# Patient Record
Sex: MALE | Race: BLACK OR AFRICAN AMERICAN | NOT HISPANIC OR LATINO | URBAN - METROPOLITAN AREA
[De-identification: names, ages, dates, MRNs, and addresses within clinical notes are randomized per-mention and may not be internally consistent; named-entity substitution may affect disease eponyms.]

---

## 2024-10-10 ENCOUNTER — INPATIENT (INPATIENT)
Facility: HOSPITAL | Age: 24
LOS: 3 days | Discharge: HOME | End: 2024-10-14
Attending: STUDENT IN AN ORGANIZED HEALTH CARE EDUCATION/TRAINING PROGRAM | Admitting: STUDENT IN AN ORGANIZED HEALTH CARE EDUCATION/TRAINING PROGRAM
Payer: MEDICAID

## 2024-10-10 VITALS
DIASTOLIC BLOOD PRESSURE: 57 MMHG | RESPIRATION RATE: 18 BRPM | WEIGHT: 205.03 LBS | HEART RATE: 122 BPM | TEMPERATURE: 99 F | SYSTOLIC BLOOD PRESSURE: 87 MMHG | OXYGEN SATURATION: 97 % | HEIGHT: 70 IN

## 2024-10-10 LAB
ADD ON TEST-SPECIMEN IN LAB: SIGNIFICANT CHANGE UP
ALBUMIN SERPL ELPH-MCNC: 4 G/DL — SIGNIFICANT CHANGE UP (ref 3.3–5)
ALP SERPL-CCNC: 65 U/L — SIGNIFICANT CHANGE UP (ref 40–120)
ALT FLD-CCNC: 40 U/L — SIGNIFICANT CHANGE UP (ref 4–41)
ANION GAP SERPL CALC-SCNC: 17 MMOL/L — HIGH (ref 7–14)
ANISOCYTOSIS BLD QL: SLIGHT — SIGNIFICANT CHANGE UP
AST SERPL-CCNC: 37 U/L — SIGNIFICANT CHANGE UP (ref 4–40)
BASOPHILS # BLD AUTO: 0 K/UL — SIGNIFICANT CHANGE UP (ref 0–0.2)
BASOPHILS NFR BLD AUTO: 0 % — SIGNIFICANT CHANGE UP (ref 0–2)
BILIRUB SERPL-MCNC: 7.3 MG/DL — HIGH (ref 0.2–1.2)
BLOOD GAS VENOUS COMPREHENSIVE RESULT: SIGNIFICANT CHANGE UP
BUN SERPL-MCNC: 20 MG/DL — SIGNIFICANT CHANGE UP (ref 7–23)
CALCIUM SERPL-MCNC: 9 MG/DL — SIGNIFICANT CHANGE UP (ref 8.4–10.5)
CHLORIDE SERPL-SCNC: 98 MMOL/L — SIGNIFICANT CHANGE UP (ref 98–107)
CK SERPL-CCNC: 81 U/L — SIGNIFICANT CHANGE UP (ref 30–200)
CO2 SERPL-SCNC: 22 MMOL/L — SIGNIFICANT CHANGE UP (ref 22–31)
CREAT SERPL-MCNC: 1.65 MG/DL — HIGH (ref 0.5–1.3)
EGFR: 59 ML/MIN/1.73M2 — LOW
EOSINOPHIL # BLD AUTO: 0 K/UL — SIGNIFICANT CHANGE UP (ref 0–0.5)
EOSINOPHIL NFR BLD AUTO: 0 % — SIGNIFICANT CHANGE UP (ref 0–6)
FLUAV AG NPH QL: SIGNIFICANT CHANGE UP
FLUBV AG NPH QL: SIGNIFICANT CHANGE UP
GAS PNL BLDV: SIGNIFICANT CHANGE UP
GIANT PLATELETS BLD QL SMEAR: PRESENT — SIGNIFICANT CHANGE UP
GLUCOSE SERPL-MCNC: 86 MG/DL — SIGNIFICANT CHANGE UP (ref 70–99)
HCT VFR BLD CALC: 50.3 % — HIGH (ref 39–50)
HGB BLD-MCNC: 17.2 G/DL — HIGH (ref 13–17)
HIV 1+2 AB+HIV1 P24 AG SERPL QL IA: SIGNIFICANT CHANGE UP
HIV 1+2 AB+HIV1 P24 AG SERPL QL IA: SIGNIFICANT CHANGE UP
IANC: 7.64 K/UL — HIGH (ref 1.8–7.4)
LYMPHOCYTES # BLD AUTO: 0.3 K/UL — LOW (ref 1–3.3)
LYMPHOCYTES # BLD AUTO: 3.5 % — LOW (ref 13–44)
MCHC RBC-ENTMCNC: 29.3 PG — SIGNIFICANT CHANGE UP (ref 27–34)
MCHC RBC-ENTMCNC: 34.2 GM/DL — SIGNIFICANT CHANGE UP (ref 32–36)
MCV RBC AUTO: 85.7 FL — SIGNIFICANT CHANGE UP (ref 80–100)
METAMYELOCYTES # FLD: 6.1 % — HIGH (ref 0–1)
MONOCYTES # BLD AUTO: 0.08 K/UL — SIGNIFICANT CHANGE UP (ref 0–0.9)
MONOCYTES NFR BLD AUTO: 0.9 % — LOW (ref 2–14)
NEUTROPHILS # BLD AUTO: 7.55 K/UL — HIGH (ref 1.8–7.4)
NEUTROPHILS NFR BLD AUTO: 80 % — HIGH (ref 43–77)
NEUTS BAND # BLD: 9.5 % — HIGH (ref 0–6)
PLAT MORPH BLD: NORMAL — SIGNIFICANT CHANGE UP
PLATELET # BLD AUTO: 21 K/UL — LOW (ref 150–400)
PLATELET COUNT - ESTIMATE: ABNORMAL
POLYCHROMASIA BLD QL SMEAR: SLIGHT — SIGNIFICANT CHANGE UP
POTASSIUM SERPL-MCNC: 4 MMOL/L — SIGNIFICANT CHANGE UP (ref 3.5–5.3)
POTASSIUM SERPL-SCNC: 4 MMOL/L — SIGNIFICANT CHANGE UP (ref 3.5–5.3)
PROT SERPL-MCNC: 6.6 G/DL — SIGNIFICANT CHANGE UP (ref 6–8.3)
RBC # BLD: 5.87 M/UL — HIGH (ref 4.2–5.8)
RBC # FLD: 12.5 % — SIGNIFICANT CHANGE UP (ref 10.3–14.5)
RBC BLD AUTO: ABNORMAL
RSV RNA NPH QL NAA+NON-PROBE: SIGNIFICANT CHANGE UP
SARS-COV-2 RNA SPEC QL NAA+PROBE: SIGNIFICANT CHANGE UP
SMUDGE CELLS # BLD: PRESENT — SIGNIFICANT CHANGE UP
SODIUM SERPL-SCNC: 137 MMOL/L — SIGNIFICANT CHANGE UP (ref 135–145)
WBC # BLD: 8.44 K/UL — SIGNIFICANT CHANGE UP (ref 3.8–10.5)
WBC # FLD AUTO: 8.44 K/UL — SIGNIFICANT CHANGE UP (ref 3.8–10.5)

## 2024-10-10 PROCEDURE — 99285 EMERGENCY DEPT VISIT HI MDM: CPT

## 2024-10-10 PROCEDURE — 71046 X-RAY EXAM CHEST 2 VIEWS: CPT | Mod: 26

## 2024-10-10 RX ORDER — ACETAMINOPHEN 325 MG
975 TABLET ORAL ONCE
Refills: 0 | Status: COMPLETED | OUTPATIENT
Start: 2024-10-10 | End: 2024-10-10

## 2024-10-10 RX ORDER — SODIUM CHLORIDE 0.9 % (FLUSH) 0.9 %
1000 SYRINGE (ML) INJECTION ONCE
Refills: 0 | Status: COMPLETED | OUTPATIENT
Start: 2024-10-10 | End: 2024-10-10

## 2024-10-10 RX ORDER — PIPERACILLIN SODIUM AND TAZOBACTAM SODIUM 12; 1.5 G/60ML; G/60ML
3.38 INJECTION, POWDER, LYOPHILIZED, FOR SOLUTION INTRAVENOUS ONCE
Refills: 0 | Status: COMPLETED | OUTPATIENT
Start: 2024-10-10 | End: 2024-10-10

## 2024-10-10 RX ADMIN — PIPERACILLIN SODIUM AND TAZOBACTAM SODIUM 200 GRAM(S): 12; 1.5 INJECTION, POWDER, LYOPHILIZED, FOR SOLUTION INTRAVENOUS at 21:39

## 2024-10-10 RX ADMIN — Medication 1000 MILLILITER(S): at 19:46

## 2024-10-10 RX ADMIN — Medication 1000 MILLILITER(S): at 21:39

## 2024-10-10 RX ADMIN — Medication 975 MILLIGRAM(S): at 21:37

## 2024-10-10 NOTE — ED PROVIDER NOTE - OBJECTIVE STATEMENT
24 year old man no PMH presenting with 5 days of fevers, body aches, chills. He recently returned from [place] 2 weeks ago, no malaria prophylaxis taken. 5 days ago he began having fevers every other day, body aches, chills, fatigue, cough. yesterday he noticed blood in his urine and it has been dark ever since. He also vomited earlier today. He denies any chest pain, SOB, sore throat, abdominal pain, diarrhea, dysuria, headache.

## 2024-10-10 NOTE — ED PROVIDER NOTE - PHYSICAL EXAMINATION
Physical Exam:  Gen: no acute distress, AOx3, nontoxic appearing  Head: NCAT  HEENT: EOMI, PEERLA, normal conjunctiva, tongue midline, oral mucosa dry  Lung: CTAB, no respiratory distress, no wheezes/rhonchi/rales B/L, speaking in full sentences  CV: RRR, no murmurs, rubs or gallops  Abd: soft, NT, ND, no guarding, no rigidity, no rebound tenderness, no CVA tenderness  MSK: no visible deformities, ROM normal in UE/LE, no neck / back pain, calf tenderness  Neuro: No focal sensory or motor deficits  Skin: Warm, well perfused, no rash, no leg swelling

## 2024-10-10 NOTE — ED ADULT TRIAGE NOTE - WEIGHT IN KG
This CM spoke with pt's daughter this day while pt off floor for dialysis. Informed pt's daughter that [facility] offered STR for pt - daughter accepted. [facility] in [facility]. This CM spoke with Risa Borjas from dialysis this day - they are presently working on getting an HD slot at [facility]. This CM to initiate prior authorization with [employer] this day. No further discharge needs at this time. CM to continue to follow. UPDATE: This CM received call from Risa Borjas with [facility] dialysis - pt had chair slot at [address] at 10:15am.  This CM spoke with nephrologist for pt who confirms pt will be \"okay\" with dialysis until Thursday at 10:15am.  At this time, all we are waiting on for discharge is prior authorization from [facility] for approval for pt to transition to SNF for STR. 93

## 2024-10-10 NOTE — ED PROVIDER NOTE - ATTENDING CONTRIBUTION TO CARE
24-year-old male otherwise healthy presents with 5 days of subjective fevers chills weakness runny nose cough nausea 1 episode of vomiting.  Patient also noted yesterday he had blood in his urine which resolved. urine is dark today does have dysuria.  Patient has had STD in the past but he is not sure if he is concerned about at this point and previous with testing.  Patient was in [place] 2 weeks ago he was not sick while he was there and was not sick 1 week after returning until this week.  Patient is well-appearing he is tacky and febrile his lungs are clear his abdomen is nontender likely viral syndrome however was initially hypotensive so we will check labs cultures flu COVID swab supportive care and reassess and will check urine as well for UTI 24-year-old male otherwise healthy presents with 5 days of subjective fevers chills weakness runny nose cough nausea 1 episode of vomiting.  Patient also noted yesterday he had blood in his urine which resolved. urine is dark today does have dysuria.  Patient has had STD in the past but he is not sure if he is concerned about at this point and previous with testing.  Patient was in [place] 2 weeks ago he was not sick while he was there and was not sick 1 week after returning until this week.  Patient is well-appearing he is tacky and febrile his lungs are clear his abdomen is nontender likely viral syndrome however was initially hypotensive so we will check labs cultures flu COVID swab supportive care and reassess and will check urine as well for UTI  pt moved to [place] about [place], visits [place] yearly, did not take malaria PPX, consider malaria

## 2024-10-10 NOTE — ED ADULT NURSE NOTE - CHIEF COMPLAINT QUOTE
C/O flu-like symptoms, cough, runny nose congestion, fevers, chills since Sunday. Reporting hematuria and dark urine since yesterday. States he was recently in [place], came back 2 weeks ago. Denies CP, SOB, rashes. Took Motrin 2 pm. No significant medical history.

## 2024-10-10 NOTE — ED ADULT TRIAGE NOTE - CHIEF COMPLAINT QUOTE
C/O flu-like symptoms, cough, runny nose congestion, fevers, chills since Sunday. Reporting hematuria and dark urine since yesterday. States he was recently in [place], came back 2 weeks ago. Took Motrin 2 pm. No significant medical history. C/O flu-like symptoms, cough, runny nose congestion, fevers, chills since Sunday. Reporting hematuria and dark urine since yesterday. States he was recently in [place], came back 2 weeks ago. Denies CP, SOB, rashes. Took Motrin 2 pm. No significant medical history.

## 2024-10-10 NOTE — ED PROVIDER NOTE - PROGRESS NOTE DETAILS
Saint Jason, DO (PGY2): Patient signed out to me at 0 700 pending CT.  Briefly, he is a 24-year-old male, with no significant past medical history, who presented for 5 days of fever, body aches, chills.  Patient with recent trip to [place] 2 weeks ago.  Labs notable for thrombocytopenia, new thrombocytopenia, with platelets of 21.  slightly elevated PT and PTT, elevated bilirubin, decreased haptoglobin, elevated lactic dehydrogenase.  UA with hematuria and nitrite positive nitrites.  Chest x-ray negative.  Patient pending CTAP results, s/p IVF and Zosyn Saint Jason, DO (PGY2): Patient signed out to me at 0700 pending CT.  Briefly, he is a 24-year-old male, with no significant past medical history, who presented for 5 days of fever, body aches, chills.  Patient with recent trip to [place] 2 weeks ago.  Labs notable for thrombocytopenia, new thrombocytopenia, with platelets of 21.  slightly elevated PT and PTT, elevated bilirubin, decreased haptoglobin, elevated lactic dehydrogenase.  UA with hematuria and nitrite positive nitrites.  Chest x-ray negative.  Patient pending CTAP results, s/p IVF and Zosyn Saint Jason, DO (PGY2): CT negative for acute pathology. Will admit patient for infectious workup Saint Jason (PGY2): Case discussed with hospitalist. Patient admitted. Saint Jason, DO (PGY2): CT negative for acute pathology. Will admit patient for infectious workup. Patient in agreement with plan

## 2024-10-10 NOTE — ED PROVIDER NOTE - CLINICAL SUMMARY MEDICAL DECISION MAKING FREE TEXT BOX
24 year old man no PMH presenting with 5 days of fevers, body aches, chills with recent travel, dark urine concerning for URI / flu, viral myositis, malaria, bronchitis, PNA, UTI. CBC, CMP, VBG, BCX, UA, UCX, CK.

## 2024-10-11 DIAGNOSIS — B50.9 PLASMODIUM FALCIPARUM MALARIA, UNSPECIFIED: ICD-10-CM

## 2024-10-11 DIAGNOSIS — Z29.9 ENCOUNTER FOR PROPHYLACTIC MEASURES, UNSPECIFIED: ICD-10-CM

## 2024-10-11 DIAGNOSIS — D69.6 THROMBOCYTOPENIA, UNSPECIFIED: ICD-10-CM

## 2024-10-11 DIAGNOSIS — R50.9 FEVER, UNSPECIFIED: ICD-10-CM

## 2024-10-11 DIAGNOSIS — N17.9 ACUTE KIDNEY FAILURE, UNSPECIFIED: ICD-10-CM

## 2024-10-11 DIAGNOSIS — D65 DISSEMINATED INTRAVASCULAR COAGULATION [DEFIBRINATION SYNDROME]: ICD-10-CM

## 2024-10-11 DIAGNOSIS — Z78.9 OTHER SPECIFIED HEALTH STATUS: Chronic | ICD-10-CM

## 2024-10-11 LAB
ADD ON TEST-SPECIMEN IN LAB: SIGNIFICANT CHANGE UP
ALBUMIN SERPL ELPH-MCNC: 3 G/DL — LOW (ref 3.3–5)
ALBUMIN SERPL ELPH-MCNC: 3.3 G/DL — SIGNIFICANT CHANGE UP (ref 3.3–5)
ALBUMIN SERPL ELPH-MCNC: 3.4 G/DL — SIGNIFICANT CHANGE UP (ref 3.3–5)
ALP SERPL-CCNC: 49 U/L — SIGNIFICANT CHANGE UP (ref 40–120)
ALP SERPL-CCNC: 54 U/L — SIGNIFICANT CHANGE UP (ref 40–120)
ALP SERPL-CCNC: 58 U/L — SIGNIFICANT CHANGE UP (ref 40–120)
ALT FLD-CCNC: 31 U/L — SIGNIFICANT CHANGE UP (ref 4–41)
ALT FLD-CCNC: 36 U/L — SIGNIFICANT CHANGE UP (ref 4–41)
ALT FLD-CCNC: 37 U/L — SIGNIFICANT CHANGE UP (ref 4–41)
AMORPH CRY # UR COMP ASSIST: PRESENT
ANION GAP SERPL CALC-SCNC: 10 MMOL/L — SIGNIFICANT CHANGE UP (ref 7–14)
ANION GAP SERPL CALC-SCNC: 10 MMOL/L — SIGNIFICANT CHANGE UP (ref 7–14)
ANION GAP SERPL CALC-SCNC: 13 MMOL/L — SIGNIFICANT CHANGE UP (ref 7–14)
APPEARANCE UR: ABNORMAL
APTT BLD: 36.4 SEC — HIGH (ref 24.5–35.6)
APTT BLD: 37.8 SEC — HIGH (ref 24.5–35.6)
AST SERPL-CCNC: 31 U/L — SIGNIFICANT CHANGE UP (ref 4–40)
AST SERPL-CCNC: 34 U/L — SIGNIFICANT CHANGE UP (ref 4–40)
AST SERPL-CCNC: 43 U/L — HIGH (ref 4–40)
BACTERIA # UR AUTO: NEGATIVE /HPF — SIGNIFICANT CHANGE UP
BASOPHILS # BLD AUTO: 0 K/UL — SIGNIFICANT CHANGE UP (ref 0–0.2)
BASOPHILS NFR BLD AUTO: 0 % — SIGNIFICANT CHANGE UP (ref 0–2)
BILIRUB DIRECT SERPL-MCNC: 3.5 MG/DL — HIGH (ref 0–0.3)
BILIRUB INDIRECT FLD-MCNC: 1.3 MG/DL — HIGH (ref 0–1)
BILIRUB SERPL-MCNC: 3.5 MG/DL — HIGH (ref 0.2–1.2)
BILIRUB SERPL-MCNC: 4 MG/DL — HIGH (ref 0.2–1.2)
BILIRUB SERPL-MCNC: 4.8 MG/DL — HIGH (ref 0.2–1.2)
BILIRUB SERPL-MCNC: 4.8 MG/DL — HIGH (ref 0.2–1.2)
BILIRUB UR-MCNC: ABNORMAL
BLD GP AB SCN SERPL QL: NEGATIVE — SIGNIFICANT CHANGE UP
BUN SERPL-MCNC: 16 MG/DL — SIGNIFICANT CHANGE UP (ref 7–23)
BUN SERPL-MCNC: 17 MG/DL — SIGNIFICANT CHANGE UP (ref 7–23)
BUN SERPL-MCNC: 19 MG/DL — SIGNIFICANT CHANGE UP (ref 7–23)
C TRACH RRNA SPEC QL NAA+PROBE: SIGNIFICANT CHANGE UP
CALCIUM SERPL-MCNC: 7.3 MG/DL — LOW (ref 8.4–10.5)
CALCIUM SERPL-MCNC: 8.3 MG/DL — LOW (ref 8.4–10.5)
CALCIUM SERPL-MCNC: 8.6 MG/DL — SIGNIFICANT CHANGE UP (ref 8.4–10.5)
CAST: 2 /LPF — SIGNIFICANT CHANGE UP (ref 0–4)
CHLORIDE SERPL-SCNC: 100 MMOL/L — SIGNIFICANT CHANGE UP (ref 98–107)
CHLORIDE SERPL-SCNC: 102 MMOL/L — SIGNIFICANT CHANGE UP (ref 98–107)
CHLORIDE SERPL-SCNC: 102 MMOL/L — SIGNIFICANT CHANGE UP (ref 98–107)
CO2 SERPL-SCNC: 23 MMOL/L — SIGNIFICANT CHANGE UP (ref 22–31)
COLOR SPEC: SIGNIFICANT CHANGE UP
CREAT SERPL-MCNC: 1.21 MG/DL — SIGNIFICANT CHANGE UP (ref 0.5–1.3)
CREAT SERPL-MCNC: 1.25 MG/DL — SIGNIFICANT CHANGE UP (ref 0.5–1.3)
CREAT SERPL-MCNC: 1.31 MG/DL — HIGH (ref 0.5–1.3)
CULTURE RESULTS: ABNORMAL
DIFF PNL FLD: ABNORMAL
EGFR: 77 ML/MIN/1.73M2 — SIGNIFICANT CHANGE UP
EGFR: 82 ML/MIN/1.73M2 — SIGNIFICANT CHANGE UP
EGFR: 85 ML/MIN/1.73M2 — SIGNIFICANT CHANGE UP
EOSINOPHIL # BLD AUTO: 0 K/UL — SIGNIFICANT CHANGE UP (ref 0–0.5)
EOSINOPHIL NFR BLD AUTO: 0 % — SIGNIFICANT CHANGE UP (ref 0–6)
FIBRINOGEN PPP-MCNC: 376 MG/DL — SIGNIFICANT CHANGE UP (ref 200–465)
GAS PNL BLDV: SIGNIFICANT CHANGE UP
GAS PNL BLDV: SIGNIFICANT CHANGE UP
GIANT PLATELETS BLD QL SMEAR: PRESENT — SIGNIFICANT CHANGE UP
GLUCOSE SERPL-MCNC: 132 MG/DL — HIGH (ref 70–99)
GLUCOSE SERPL-MCNC: 86 MG/DL — SIGNIFICANT CHANGE UP (ref 70–99)
GLUCOSE SERPL-MCNC: 88 MG/DL — SIGNIFICANT CHANGE UP (ref 70–99)
GLUCOSE UR QL: NEGATIVE MG/DL — SIGNIFICANT CHANGE UP
HAPTOGLOB SERPL-MCNC: 27 MG/DL — LOW (ref 34–200)
HCT VFR BLD CALC: 39.9 % — SIGNIFICANT CHANGE UP (ref 39–50)
HCT VFR BLD CALC: 41.6 % — SIGNIFICANT CHANGE UP (ref 39–50)
HCT VFR BLD CALC: 43.1 % — SIGNIFICANT CHANGE UP (ref 39–50)
HGB BLD-MCNC: 13.7 G/DL — SIGNIFICANT CHANGE UP (ref 13–17)
HGB BLD-MCNC: 14.6 G/DL — SIGNIFICANT CHANGE UP (ref 13–17)
HGB BLD-MCNC: 15.1 G/DL — SIGNIFICANT CHANGE UP (ref 13–17)
IANC: 5.48 K/UL — SIGNIFICANT CHANGE UP (ref 1.8–7.4)
INR BLD: 1.25 RATIO — HIGH (ref 0.85–1.16)
INR BLD: 1.49 RATIO — HIGH (ref 0.85–1.16)
KETONES UR-MCNC: 40 MG/DL
LDH SERPL L TO P-CCNC: 300 U/L — HIGH (ref 135–225)
LEUKOCYTE ESTERASE UR-ACNC: ABNORMAL
LYMPHOCYTES # BLD AUTO: 0.82 K/UL — LOW (ref 1–3.3)
LYMPHOCYTES # BLD AUTO: 11.3 % — LOW (ref 13–44)
MAGNESIUM SERPL-MCNC: 1.8 MG/DL — SIGNIFICANT CHANGE UP (ref 1.6–2.6)
MAGNESIUM SERPL-MCNC: 1.9 MG/DL — SIGNIFICANT CHANGE UP (ref 1.6–2.6)
MCHC RBC-ENTMCNC: 29.1 PG — SIGNIFICANT CHANGE UP (ref 27–34)
MCHC RBC-ENTMCNC: 29.1 PG — SIGNIFICANT CHANGE UP (ref 27–34)
MCHC RBC-ENTMCNC: 29.9 PG — SIGNIFICANT CHANGE UP (ref 27–34)
MCHC RBC-ENTMCNC: 34.3 GM/DL — SIGNIFICANT CHANGE UP (ref 32–36)
MCHC RBC-ENTMCNC: 35 GM/DL — SIGNIFICANT CHANGE UP (ref 32–36)
MCHC RBC-ENTMCNC: 35.1 GM/DL — SIGNIFICANT CHANGE UP (ref 32–36)
MCV RBC AUTO: 83 FL — SIGNIFICANT CHANGE UP (ref 80–100)
MCV RBC AUTO: 84.9 FL — SIGNIFICANT CHANGE UP (ref 80–100)
MCV RBC AUTO: 85.3 FL — SIGNIFICANT CHANGE UP (ref 80–100)
METAMYELOCYTES # FLD: 0.9 % — SIGNIFICANT CHANGE UP (ref 0–1)
MONOCYTES # BLD AUTO: 0.07 K/UL — SIGNIFICANT CHANGE UP (ref 0–0.9)
MONOCYTES NFR BLD AUTO: 0.9 % — LOW (ref 2–14)
MUCOUS THREADS # UR AUTO: PRESENT
N GONORRHOEA RRNA SPEC QL NAA+PROBE: SIGNIFICANT CHANGE UP
NEUTROPHILS # BLD AUTO: 6.15 K/UL — SIGNIFICANT CHANGE UP (ref 1.8–7.4)
NEUTROPHILS NFR BLD AUTO: 51.3 % — SIGNIFICANT CHANGE UP (ref 43–77)
NEUTS BAND # BLD: 33 % — CRITICAL HIGH (ref 0–6)
NITRITE UR-MCNC: POSITIVE
NRBC # BLD: 0 /100 WBCS — SIGNIFICANT CHANGE UP (ref 0–0)
NRBC # BLD: 0 /100 WBCS — SIGNIFICANT CHANGE UP (ref 0–0)
NRBC # FLD: 0 K/UL — SIGNIFICANT CHANGE UP (ref 0–0)
NRBC # FLD: 0 K/UL — SIGNIFICANT CHANGE UP (ref 0–0)
PH UR: 6 — SIGNIFICANT CHANGE UP (ref 5–8)
PHOSPHATE SERPL-MCNC: 1.3 MG/DL — LOW (ref 2.5–4.5)
PHOSPHATE SERPL-MCNC: 1.9 MG/DL — LOW (ref 2.5–4.5)
PLAT MORPH BLD: NORMAL — SIGNIFICANT CHANGE UP
PLATELET # BLD AUTO: 19 K/UL — CRITICAL LOW (ref 150–400)
PLATELET # BLD AUTO: 22 K/UL — LOW (ref 150–400)
PLATELET # BLD AUTO: 25 K/UL — LOW (ref 150–400)
PLATELET COUNT - ESTIMATE: ABNORMAL
POIKILOCYTOSIS BLD QL AUTO: SLIGHT — SIGNIFICANT CHANGE UP
POTASSIUM SERPL-MCNC: 3.6 MMOL/L — SIGNIFICANT CHANGE UP (ref 3.5–5.3)
POTASSIUM SERPL-MCNC: 3.7 MMOL/L — SIGNIFICANT CHANGE UP (ref 3.5–5.3)
POTASSIUM SERPL-MCNC: 3.8 MMOL/L — SIGNIFICANT CHANGE UP (ref 3.5–5.3)
POTASSIUM SERPL-SCNC: 3.6 MMOL/L — SIGNIFICANT CHANGE UP (ref 3.5–5.3)
POTASSIUM SERPL-SCNC: 3.7 MMOL/L — SIGNIFICANT CHANGE UP (ref 3.5–5.3)
POTASSIUM SERPL-SCNC: 3.8 MMOL/L — SIGNIFICANT CHANGE UP (ref 3.5–5.3)
PROCALCITONIN SERPL-MCNC: 25.99 NG/ML — HIGH (ref 0.02–0.1)
PROT SERPL-MCNC: 4.9 G/DL — LOW (ref 6–8.3)
PROT SERPL-MCNC: 6.2 G/DL — SIGNIFICANT CHANGE UP (ref 6–8.3)
PROT SERPL-MCNC: 6.2 G/DL — SIGNIFICANT CHANGE UP (ref 6–8.3)
PROT UR-MCNC: 100 MG/DL
PROTHROM AB SERPL-ACNC: 14.8 SEC — HIGH (ref 9.9–13.4)
PROTHROM AB SERPL-ACNC: 17.7 SEC — HIGH (ref 9.9–13.4)
RBC # BLD: 4.7 M/UL — SIGNIFICANT CHANGE UP (ref 4.2–5.8)
RBC # BLD: 5.01 M/UL — SIGNIFICANT CHANGE UP (ref 4.2–5.8)
RBC # BLD: 5.05 M/UL — SIGNIFICANT CHANGE UP (ref 4.2–5.8)
RBC # FLD: 12.9 % — SIGNIFICANT CHANGE UP (ref 10.3–14.5)
RBC # FLD: 13 % — SIGNIFICANT CHANGE UP (ref 10.3–14.5)
RBC # FLD: 13.1 % — SIGNIFICANT CHANGE UP (ref 10.3–14.5)
RBC BLD AUTO: NORMAL — SIGNIFICANT CHANGE UP
RBC CASTS # UR COMP ASSIST: 13 /HPF — HIGH (ref 0–4)
REVIEW: SIGNIFICANT CHANGE UP
RH IG SCN BLD-IMP: POSITIVE — SIGNIFICANT CHANGE UP
SMUDGE CELLS # BLD: PRESENT — SIGNIFICANT CHANGE UP
SODIUM SERPL-SCNC: 135 MMOL/L — SIGNIFICANT CHANGE UP (ref 135–145)
SODIUM SERPL-SCNC: 135 MMOL/L — SIGNIFICANT CHANGE UP (ref 135–145)
SODIUM SERPL-SCNC: 136 MMOL/L — SIGNIFICANT CHANGE UP (ref 135–145)
SP GR SPEC: 1.03 — SIGNIFICANT CHANGE UP (ref 1–1.03)
SPECIMEN SOURCE: SIGNIFICANT CHANGE UP
SPECIMEN SOURCE: SIGNIFICANT CHANGE UP
SQUAMOUS # UR AUTO: 2 /HPF — SIGNIFICANT CHANGE UP (ref 0–5)
UROBILINOGEN FLD QL: 2 MG/DL (ref 0.2–1)
VARIANT LYMPHS # BLD: 2.6 % — SIGNIFICANT CHANGE UP (ref 0–6)
WBC # BLD: 6.39 K/UL — SIGNIFICANT CHANGE UP (ref 3.8–10.5)
WBC # BLD: 7.23 K/UL — SIGNIFICANT CHANGE UP (ref 3.8–10.5)
WBC # BLD: 7.29 K/UL — SIGNIFICANT CHANGE UP (ref 3.8–10.5)
WBC # FLD AUTO: 6.39 K/UL — SIGNIFICANT CHANGE UP (ref 3.8–10.5)
WBC # FLD AUTO: 7.23 K/UL — SIGNIFICANT CHANGE UP (ref 3.8–10.5)
WBC # FLD AUTO: 7.29 K/UL — SIGNIFICANT CHANGE UP (ref 3.8–10.5)
WBC UR QL: 0 /HPF — SIGNIFICANT CHANGE UP (ref 0–5)

## 2024-10-11 PROCEDURE — 99254 IP/OBS CNSLTJ NEW/EST MOD 60: CPT | Mod: GC

## 2024-10-11 PROCEDURE — 74177 CT ABD & PELVIS W/CONTRAST: CPT | Mod: 26,MC

## 2024-10-11 PROCEDURE — 99223 1ST HOSP IP/OBS HIGH 75: CPT

## 2024-10-11 RX ORDER — ARTESUNATE 110 MG
220 KIT INTRAVENOUS EVERY 12 HOURS
Refills: 0 | Status: COMPLETED | OUTPATIENT
Start: 2024-10-11 | End: 2024-10-12

## 2024-10-11 RX ORDER — ACETAMINOPHEN 325 MG
650 TABLET ORAL EVERY 6 HOURS
Refills: 0 | Status: DISCONTINUED | OUTPATIENT
Start: 2024-10-11 | End: 2024-10-14

## 2024-10-11 RX ORDER — ARTEMETHER AND LUMEFANTRINE 20; 120 MG/1; MG/1
4 TABLET ORAL EVERY 8 HOURS
Refills: 0 | Status: DISCONTINUED | OUTPATIENT
Start: 2024-10-11 | End: 2024-10-11

## 2024-10-11 RX ORDER — SODIUM PHOSPHATE IN D5W 15MMOL/250
15 PLASTIC BAG, INJECTION (ML) INTRAVENOUS ONCE
Refills: 0 | Status: COMPLETED | OUTPATIENT
Start: 2024-10-11 | End: 2024-10-11

## 2024-10-11 RX ORDER — INFLUENZA VIRUS VACCINE 15; 15; 15; 15 UG/.5ML; UG/.5ML; UG/.5ML; UG/.5ML
0.5 SUSPENSION INTRAMUSCULAR ONCE
Refills: 0 | Status: DISCONTINUED | OUTPATIENT
Start: 2024-10-11 | End: 2024-10-14

## 2024-10-11 RX ORDER — PIPERACILLIN SODIUM AND TAZOBACTAM SODIUM 12; 1.5 G/60ML; G/60ML
3.38 INJECTION, POWDER, LYOPHILIZED, FOR SOLUTION INTRAVENOUS ONCE
Refills: 0 | Status: COMPLETED | OUTPATIENT
Start: 2024-10-11 | End: 2024-10-11

## 2024-10-11 RX ORDER — PIPERACILLIN SODIUM AND TAZOBACTAM SODIUM 12; 1.5 G/60ML; G/60ML
3.38 INJECTION, POWDER, LYOPHILIZED, FOR SOLUTION INTRAVENOUS ONCE
Refills: 0 | Status: DISCONTINUED | OUTPATIENT
Start: 2024-10-11 | End: 2024-10-11

## 2024-10-11 RX ORDER — ARTEMETHER AND LUMEFANTRINE 20; 120 MG/1; MG/1
TABLET ORAL
Refills: 0 | Status: DISCONTINUED | OUTPATIENT
Start: 2024-10-11 | End: 2024-10-11

## 2024-10-11 RX ORDER — 5-HYDROXYTRYPTOPHAN (5-HTP) 100 MG
3 TABLET,DISINTEGRATING ORAL AT BEDTIME
Refills: 0 | Status: DISCONTINUED | OUTPATIENT
Start: 2024-10-11 | End: 2024-10-14

## 2024-10-11 RX ADMIN — ARTEMETHER AND LUMEFANTRINE 4 TABLET(S): 20; 120 TABLET ORAL at 12:45

## 2024-10-11 RX ADMIN — ARTESUNATE 220 MILLIGRAM(S): KIT at 14:33

## 2024-10-11 RX ADMIN — Medication 650 MILLIGRAM(S): at 21:57

## 2024-10-11 RX ADMIN — Medication 63.75 MILLIMOLE(S): at 17:17

## 2024-10-11 RX ADMIN — Medication 650 MILLIGRAM(S): at 22:57

## 2024-10-11 RX ADMIN — PIPERACILLIN SODIUM AND TAZOBACTAM SODIUM 25 GRAM(S): 12; 1.5 INJECTION, POWDER, LYOPHILIZED, FOR SOLUTION INTRAVENOUS at 07:47

## 2024-10-11 NOTE — H&P ADULT - PROBLEM SELECTOR PLAN 2
-consent for transfusion  -trend plt, coags 2/2 hemolysis 2/2 falciparum malaria infection. Plt on admission 21 -> 19. PT/PTT/INR 17.7/36.4/1.49 on admission.    -consent for transfusion  -trend plt, coags

## 2024-10-11 NOTE — H&P ADULT - NSHPSOCIALHISTORY_GEN_ALL_CORE
Visits [place] 2x per year to visit extended family, works as an . Sexually active, tested once per year.

## 2024-10-11 NOTE — H&P ADULT - NSHPPHYSICALEXAM_GEN_ALL_CORE
GENERAL: NAD, lying in bed comfortably, conversational  HEAD:  Atraumatic, normocephalic  EYES: EOMI, PERRLA, conjunctiva and icteric sclera  MOUTH: inferior surface of tongue with jaundice  NECK: Supple, trachea midline, no JVD  HEART: +S1/S2, RRR, no murmurs, rubs, or gallops  LUNGS: Unlabored respirations. CTA b/l, no crackles, wheezing, or rhonchi  ABDOMEN: Soft, NTND, +BS. No masses or hernia palpated  EXTREMITIES: 2+ peripheral pulses bilaterally. No clubbing, cyanosis, or edema  MSK: no gross deformity in extremities, no step off or deformity in C/T/L spine, normal muscle strength/tone  NERVOUS SYSTEM:  A&Ox3, moving all extremities spontaneously, sensation intact and equal in b/l extremities  SKIN: No rashes, lesions, bites, or discoloration

## 2024-10-11 NOTE — PROVIDER CONTACT NOTE (CRITICAL VALUE NOTIFICATION) - BACKGROUND
Patient admitted for fever due to unspecified condition.   PMH of no pertinent past medical history.

## 2024-10-11 NOTE — PROVIDER CONTACT NOTE (CRITICAL VALUE NOTIFICATION) - TEST AND RESULT REPORTED:
Final report blood cultures from 10/11/2024 positive plasmodium falciparum by giemsa stain parasitemia = 8%.
blood parasite + plasmodium Falciparum antigen
bands 33%, platelets 19

## 2024-10-11 NOTE — H&P ADULT - PROBLEM SELECTOR PLAN 1
Patient meets severe criteria:       Plan:  - Artesunate  - ID recs appreciated  - s/p zosyn x1 in ED Patient meets severe criteria (must meet at least 2 criteria):  - Tbili > 3mg/dL (7.3 on admission) PLUS parasitemia > 2.5% (8% on BCx)  - Parasitemia > 5% (8% on BCx)  - BUN > 20 (20 on admission)      Plan:  - IV Artesunate 220mg q12  for total 3 doses   - ID recs appreciated  - s/p zosyn x1 in ED Patient meets severe criteria (must meet at least 2 criteria):  - Tbili > 3mg/dL (7.3 on admission) PLUS parasitemia > 2.5% (8% on BCx)  - Parasitemia > 5% (8% on BCx)  - BUN > 20 (20 on admission)      Plan:  - IV Artesunate 220mg q12  for total 3 doses  -- First dose 10/11 at 2PM  -- Second dose 10/12 at 2AM  -- Third dose 10/12 at 2 PM    - q6 CBC, CMP to trend Hgb, Plt, serum glucose, T/Dbili  - Repeat parasite count 24hr from first dose (2PM on 10/12) ->  - ID recs appreciated  - s/p zosyn x1 in ED

## 2024-10-11 NOTE — H&P ADULT - NSHPREVIEWOFSYSTEMS_GEN_ALL_CORE
REVIEW OF SYSTEMS:  CONSTITUTIONAL:  No weakness  EYES/ENT:  No visual changes;  No vertigo or throat pain   NECK:  No pain or stiffness  RESPIRATORY:  No cough, wheezing, hemoptysis; No shortness of breath  CARDIOVASCULAR:  No chest pain or palpitations  GASTROINTESTINAL:  No abdominal or epigastric pain. No nausea, vomiting, or hematemesis; No diarrhea or constipation. No melena or hematochezia.  GENITOURINARY:  +pain with urination, hematuria  NEUROLOGICAL:  No numbness or weakness  SKIN:  No itching, rashes

## 2024-10-11 NOTE — H&P ADULT - TIME BILLING
Time-based billing (NON-critical care).     More than 50% of the visit was spent counseling and / or coordinating care by the attending physician.      The necessity of the time spent during the encounter on this date of service was due to: documentation in [place], reviewing chart and coordinating care with patient/residents and interdisciplinary staff (such as , social workers, etc) as well as reviewing vitals, laboratory data, radiology, medication list, consultants' recommendations and prior records. Interventions were performed as documented above.

## 2024-10-11 NOTE — H&P ADULT - ASSESSMENT
Patient 24M with no PMHx presenting to the emergency department with 5 days of recurrent fevers following travel to [place] two weeks ago now with BCx positive for Plasmodium falciparum.  Patient 24M with no PMHx presenting to the emergency department with 5 days of recurrent fevers following travel to [place] two weeks ago with TBili 7.3 on admission and BCx positive for Plasmodium falciparum (parasitemia 8%) consistent with severe falciparum malaria.

## 2024-10-11 NOTE — ED ADULT NURSE REASSESSMENT NOTE - NS ED NURSE REASSESS COMMENT FT1
FLOAT RN: pt lying in stretcher, not in acute distress. denies pain at this time. Respirations are even and unlabored, sinus tachycardia on monitor, IV is patent and intact. Vitals as charted. repeat labs drawn and sent. Bed in lowest position, safety maintained.
PT is resting in stretcher, easily arousable to verbal stimuli. no apparent distress noted at this time. hand off will be given to primary nurse when return to area.
Report received from ABIGAIL Reyes. Pt A&Ox3 sleeping in stretcher at this time, arousable to verbal and tactile stimuli. Respirations even and unlabored on room air. No acute distress noted. Denies headache, dizziness, chest pain and SOB at this time. Pt endorsing chills at this time. Pt remains on continuous cardiac monitor, sinus tachycardia noted. MD Gimenez aware. Plan of care ongoing, comfort measures provided and safety measures maintained. Awaiting further orders.
Upon reassessment pt sleeping in stretcher at this time, arousable to verbal and tactile stimuli. Respirations even and unlabored on room air. No acute distress noted. Pt remains on continuous cardiac monitor, NSR noted. VS as noted in flow sheet. Pt afebrile at this time. Denies headache, dizziness, chest pain and SOB at this time. Plan of care ongoing, comfort measures provided and safety measures maintained. Awaiting CT.
pt laying in stretcher, no acute distress. Pt denies any complaints at present. SR HR 90s tele monitor. Breathing easy and unlabored.
Upon reassessment pt A&Ox3 sitting up in stretcher resting comfortably. Respirations even and unlabored on room air. No acute distress noted. Denies headache, dizziness, chest pain and SOB at this time. Pt remains on continuous cardiac monitor, sinus tachycardia noted. MD Saint Jason aware. VS as noted in flow sheet. Labs drawn and sent. Pt medicated per EMAR. Plan of care ongoing, comfort measures provided and safety measures maintained. Awaiting bed assignment.

## 2024-10-11 NOTE — PATIENT PROFILE ADULT - FALL HARM RISK - RISK INTERVENTIONS

## 2024-10-11 NOTE — PROVIDER CONTACT NOTE (CRITICAL VALUE NOTIFICATION) - SITUATION
Pt came in for fever, r/o sepsis
Fever of unknown origin, r/o sepsis. Result vebally given to Dr. Ugarte who was in exam room evaluating pt at time of call received by lab.
Final report blood cultures from 10/11/2024 positive plasmodium falciparum by giemsa stain parasitemia = 8%.

## 2024-10-11 NOTE — H&P ADULT - PROBLEM SELECTOR PLAN 5
- currently Plt 19  - no need for AC at this time - Plt 21 on admission -> 19  - no AC at this time    - Full diet, no pork per patient

## 2024-10-11 NOTE — H&P ADULT - PROBLEM SELECTOR PLAN 4
- currently Plt 19  - no need for AC at this time Likely secondary to infection, hyperbilirubinemia. On admission, BUN 20, Cr 1.65    -trend BUN/Cr  - c/w IVF Likely secondary to infection, hyperbilirubinemia. On admission, BUN 20, Cr 1.65    - trend BUN/Cr

## 2024-10-11 NOTE — H&P ADULT - HISTORY OF PRESENT ILLNESS
Patient is a 24M no pPMHx presenting to the emergency department following 5 days of fevers. The patient recently traveled to [place] for two weeks to visit family. He returned on 9/27. On Sammy 10/6, he began to have fevers which he treated at home with motrin with limited success. On Monday he began to have a cough which he describes as intermittent, nonproductive, and nonbloody although states he is not coughing at time of admission. On Tuesday he again had fever and chills, and on Wednesday noticed his urine was blood colored. He endorses pain with urination. On Thursday he continued to have a fever and had a single episode of N/V (NBNB) but has been tolerating PO since then. Today he states he feels like he has a fever and some mild dizziness and lightheadedness but no CP/SOB/MAYA or orthopnea. He did not receive any travel vaccinations/prophylaxis prior to traveling. He states he last received any kind of pre-travel intervention in 2018. Per patient, he visits [place] twice per year to visit family where he mostly stays with his family in a city environment, but during his most recent trip he also visited his [place] the [place] with daily heavy rainfall. Also states it is currently the rainy season. He denied using any mosquito net/barrier during his stay although was sleeping indoors. He denies any known sick contacts. He denies any bug bites that he is aware of. He denies any contact with domesticated or wild animals including rodents. He states he is sexually active, was last tested 1 year ago with no positive results. He denies any genital itching, discharge, or other lesions. While in [place], he only consumed home-cooked and restaurant meals, he did not eat at any food stands or street vendors. He only consumed bottled water. He denies swimming or wading in water during his stay. He denies any new rash or skin findings to his knowledge.

## 2024-10-11 NOTE — CONSULT NOTE ADULT - SUBJECTIVE AND OBJECTIVE BOX
Patient is a 24y old  Male who presents with a chief complaint of     HPI:    24 year old man no PMH presenting with 5 days of fevers, body aches, chills. Associated vomiting and dark-colored urine. He recently returned from [place] 2 weeks ago, no malaria prophylaxis taken. ID consulted for malaria.    Patient was recently in [place] for 2 weeks and returned 2 weeks ago. While there he attended soccer games, ate out at restaurants, and spent time with his family in the house. He started feeling sick on 10/6 upon [place] to the [place] with fever, body aches. Developed dark-colored urine. Also had poor PO intake and vomiting x1 day. He has been to [place] many times over his life and has never had malaria before. He did not take prophylaxis prior to travel. He did not note any mosquito bites while there but also did not use bed net or bug sprays, but noted he slept with the A/C on.    Currently feeling well; no fever/body aches today. Eating well. Normal amount of urine but still discolored.       REVIEW OF SYSTEMS  Constitutional: +fever/chills  Skin: No rash, no phlebitis	  Eyes: No discharge	  ENMT: No sore throat, oral thrush, ulcers or exudate  Respiratory: No cough, no SOB  Cardiovascular:  No chest pain, palpitations or edema   Gastrointestinal: +vomiting  Genitourinary: No dysuria, discharge or flank pain. +dark urine  MSK: No arthralgias or back pain   Neurological: No HA, no weakness, no seizures, no AMS       prior hospital charts reviewed [V]  primary team notes reviewed [V]  other consultant notes reviewed [V]    PAST MEDICAL & SURGICAL HISTORY:  No pertinent past medical history    No significant past surgical history    SOCIAL HISTORY:  - Born in [place]; moved to  as a child; frequently visits [place] >10 times over his life  - Works as   - Denied tobacco    FAMILY HISTORY:    Allergies  No Known Allergies    ANTIMICROBIALS:  artemether 20 mG/lumefantrine 120 mG Tablet 4 every 8 hours  artemether 20 mG/lumefantrine 120 mG Tablet        ANTIMICROBIALS (past 90 days):  MEDICATIONS  (STANDING):  artemether 20 mG/lumefantrine 120 mG Tablet   4 Tablet(s) Oral (10-11-24 @ 12:45)    piperacillin/tazobactam IVPB..   25 mL/Hr IV Intermittent (10-11-24 @ 07:47)    piperacillin/tazobactam IVPB...   200 mL/Hr IV Intermittent (10-10-24 @ 21:39)        OTHER MEDS:   MEDICATIONS  (STANDING):  acetaminophen     Tablet .. 650 every 6 hours PRN  melatonin 3 at bedtime PRN      VITALS:  Vital Signs Last 24 Hrs  T(F): 97.9 (10-11-24 @ 10:41), Max: 101.2 (10-10-24 @ 21:25)    Vital Signs Last 24 Hrs  HR: 93 (10-11-24 @ 10:41) (93 - 137)  BP: 102/62 (10-11-24 @ 10:41) (87/57 - 112/73)  RR: 18 (10-11-24 @ 10:41)  SpO2: 100% (10-11-24 @ 10:41) (97% - 100%)  Wt(kg): --    EXAM:  General: Patient in no acute distress  HEENT: +icterus  CV: S1+S2, no m/r/g appreciated   Lungs: No respiratory distress, CTAB  Abd: Soft, nontender, no guarding  Ext: No cyanosis, no edema  Neuro: Alert and oriented  Skin: No rash      Labs:                        13.7   7.29  )-----------( 19       ( 11 Oct 2024 07:45 )             39.9     10-11    135  |  102  |  19  ----------------------------<  88  3.6   |  23  |  1.31[H]    Ca    7.3[L]      11 Oct 2024 07:45    TPro  4.9[L]  /  Alb  3.0[L]  /  TBili  4.8[H]  /  DBili  3.5[H]  /  AST  34  /  ALT  31  /  AlkPhos  49  10-11      WBC Trend:  WBC Count: 7.29 (10-11-24 @ 07:45)  WBC Count: 8.44 (10-10-24 @ 19:34)      Auto Neutrophil #: 6.15 K/uL (10-11-24 @ 07:45)  Band Neutrophils %: 33.0 % (10-11-24 @ 07:45)  Auto Neutrophil #: 7.55 K/uL (10-10-24 @ 19:34)  Band Neutrophils %: 9.5 % (10-10-24 @ 19:34)      Creatine Trend:  Creatinine: 1.31 (10-11)  Creatinine: 1.65 (10-10)      Liver Biochemical Testing Trend:  Alanine Aminotransferase (ALT/SGPT): 31 (10-11)  Alanine Aminotransferase (ALT/SGPT): 40 (10-10)  Aspartate Aminotransferase (AST/SGOT): 34 (10-11-24 @ 07:45)  Aspartate Aminotransferase (AST/SGOT): 37 (10-10-24 @ 19:34)  Bilirubin Total: 4.8 (10-11)  Bilirubin Total: 4.8 (10-11)  Bilirubin Direct: 3.5 (10-11)  Bilirubin Direct: 3.5 (10-11)  Bilirubin Total: 7.3 (10-10)    Trend LDH  10-11-24 @ 07:45  300[H]      Auto Eosinophil %: 0.0 % (10-11-24 @ 07:45)  Auto Eosinophil %: 0.0 % (10-10-24 @ 19:34)      Urinalysis Basic - ( 11 Oct 2024 07:45 )    Color: x / Appearance: x / SG: x / pH: x  Gluc: 88 mg/dL / Ketone: x  / Bili: x / Urobili: x   Blood: x / Protein: x / Nitrite: x   Leuk Esterase: x / RBC: x / WBC x   Sq Epi: x / Non Sq Epi: x / Bacteria: x    MICROBIOLOGY:    Urinalysis with Rflx Culture (collected 11 Oct 2024 00:05)      HIV-1/2 Combo Result: Nonreact (10-10-24 @ 21:13)  HIV-1/2 Combo Result: Nonreact (10-10-24 @ 21:13)    Lactate Dehydrogenase, Serum: 300 (10-11)    Blood Gas Venous - Lactate: 2.5 (10-11 @ 07:45)  Blood Gas Venous - Lactate: 3.3 (10-10 @ 23:37)  Blood Gas Venous - Lactate: 3.9 (10-10 @ 19:34)    RADIOLOGY:  imaging below personally reviewed    < from: CT Abdomen and Pelvis w/ IV Cont (10.11.24 @ 07:22) >  FINDINGS:  LOWER CHEST: Bilateral lower lobe subpleural linear/consolidative   opacities, likely atelectasis.    LIVER: Within normal limits.  BILE DUCTS: Normal caliber.  GALLBLADDER: Within normal limits.  SPLEEN: Splenomegaly, [id number] cm in the AP dimension.  PANCREAS: Within normal limits.  ADRENALS: Within normal limits.  KIDNEYS/URETERS: Bilaterally symmetric renal enhancement pattern. No   hydronephrosis. No nephrolithiasis.    BLADDER: Within normal limits.  REPRODUCTIVEORGANS: Prostate within normal limits.    BOWEL: No bowel obstruction. Appendix is normal.  PERITONEUM/RETROPERITONEUM: Within normal limits.  VESSELS: Within normal limits.  LYMPH NODES: No lymphadenopathy.  ABDOMINAL WALL: Small fat-containing umbilical hernia.  BONES: Within normal limits.    IMPRESSION:  No acute pathology.  Splenomegaly.    < end of copied text >  < from: Xray Chest 2 Views PA/Lat (10.10.24 @ 20:16) >  FINDINGS:    The heart is normal in size.  The lungs are clear.  There is no pneumothorax or pleural effusion.    IMPRESSION:  Clear lungs.    < end of copied text >   Patient is a 24y old  Male who presents with a chief complaint of     HPI:    24 year old man no PMH presenting with 5 days of fevers, body aches, chills. Associated vomiting and dark-colored urine. He recently returned from [place] 2 weeks ago, no malaria prophylaxis taken. ID consulted for malaria.    Patient was recently in [place] for 2 weeks and returned 2 weeks ago. While there he attended soccer games, ate out at restaurants, and spent time with his family in the house. He started feeling sick on 10/6 upon [place] to the [place] with fever, body aches. Developed dark-colored urine. Also had poor PO intake and vomiting x1 day. He has been to [place] many times over his life and has never had malaria before. He did not take prophylaxis prior to travel. He did not note any mosquito bites while there but also did not use bed net or bug sprays, but noted he slept with the A/C on.    Currently feeling well; no fever/body aches today. Eating well. Normal amount of urine but still discolored.       REVIEW OF SYSTEMS  Constitutional: +fever/chills  Skin: No rash, 	  Eyes: No discharge	  ENMT: No sore throat, oral thrush,   Respiratory: No cough, no SOB  Cardiovascular:  No chest pain,   Gastrointestinal: +vomiting  Genitourinary: No dysuria, discharge or flank pain. +dark urine  MSK: No arthralgias   Neurological: No HA,  no AMS   Extremities: No edema     prior hospital charts reviewed [V]  primary team notes reviewed [V]  other consultant notes reviewed [V]      PAST MEDICAL & SURGICAL HISTORY:  No pertinent past medical history    No significant past surgical history      SOCIAL HISTORY:  - Born in [place]; moved to  as a child; frequently visits [place] >10 times over his life  - Works as   - Denied tobacco        FAMILY HISTORY:  Denies any family hx of DM in first degree relatives.         Allergies  No Known Allergies    ANTIMICROBIALS:  artemether 20 mG/lumefantrine 120 mG Tablet 4 every 8 hours  artemether 20 mG/lumefantrine 120 mG Tablet        ANTIMICROBIALS (past 90 days):  MEDICATIONS  (STANDING):  artemether 20 mG/lumefantrine 120 mG Tablet   4 Tablet(s) Oral (10-11-24 @ 12:45)    piperacillin/tazobactam IVPB..   25 mL/Hr IV Intermittent (10-11-24 @ 07:47)    piperacillin/tazobactam IVPB...   200 mL/Hr IV Intermittent (10-10-24 @ 21:39)        OTHER MEDS:   MEDICATIONS  (STANDING):  acetaminophen     Tablet .. 650 every 6 hours PRN  melatonin 3 at bedtime PRN      VITALS:  Vital Signs Last 24 Hrs  T(F): 97.9 (10-11-24 @ 10:41), Max: 101.2 (10-10-24 @ 21:25)    Vital Signs Last 24 Hrs  HR: 93 (10-11-24 @ 10:41) (93 - 137)  BP: 102/62 (10-11-24 @ 10:41) (87/57 - 112/73)  RR: 18 (10-11-24 @ 10:41)  SpO2: 100% (10-11-24 @ 10:41) (97% - 100%)  Wt(kg): --      EXAM:  General: Patient in no acute distress  Eyes: +icterus  ENT: No oral ulcers   CV: S1+S2, normal   Lungs: No respiratory distress, CTAB  Abd: Soft, nontender  Ext: No edema  Neuro: Alert and oriented  Skin: No rash      Labs:                        13.7     7.29  )-----------( 19       ( 11 Oct 2024 07:45 )             39.9     10-11    135  |  102  |  19  ----------------------------<  88  3.6   |  23  |  1.31[H]    Ca    7.3[L]      11 Oct 2024 07:45    TPro  4.9[L]  /  Alb  3.0[L]  /  TBili  4.8[H]  /  DBili  3.5[H]  /  AST  34  /  ALT  31  /  AlkPhos  49  10-11      WBC Trend:  WBC Count: 7.29 (10-11-24 @ 07:45)  WBC Count: 8.44 (10-10-24 @ 19:34)      Auto Neutrophil #: 6.15 K/uL (10-11-24 @ 07:45)  Band Neutrophils %: 33.0 % (10-11-24 @ 07:45)  Auto Neutrophil #: 7.55 K/uL (10-10-24 @ 19:34)  Band Neutrophils %: 9.5 % (10-10-24 @ 19:34)      Creatine Trend:  Creatinine: 1.31 (10-11)  Creatinine: 1.65 (10-10)      Liver Biochemical Testing Trend:  Alanine Aminotransferase (ALT/SGPT): 31 (10-11)  Alanine Aminotransferase (ALT/SGPT): 40 (10-10)  Aspartate Aminotransferase (AST/SGOT): 34 (10-11-24 @ 07:45)  Aspartate Aminotransferase (AST/SGOT): 37 (10-10-24 @ 19:34)  Bilirubin Total: 4.8 (10-11)  Bilirubin Total: 4.8 (10-11)  Bilirubin Direct: 3.5 (10-11)  Bilirubin Direct: 3.5 (10-11)  Bilirubin Total: 7.3 (10-10)    Trend LDH  10-11-24 @ 07:45  300[H]      Auto Eosinophil %: 0.0 % (10-11-24 @ 07:45)  Auto Eosinophil %: 0.0 % (10-10-24 @ 19:34)      Urinalysis Basic - ( 11 Oct 2024 07:45 )    Color: x / Appearance: x / SG: x / pH: x  Gluc: 88 mg/dL / Ketone: x  / Bili: x / Urobili: x   Blood: x / Protein: x / Nitrite: x   Leuk Esterase: x / RBC: x / WBC x   Sq Epi: x / Non Sq Epi: x / Bacteria: x    MICROBIOLOGY:    Urinalysis with Rflx Culture (collected 11 Oct 2024 00:05)      HIV-1/2 Combo Result: Nonreact (10-10-24 @ 21:13)  HIV-1/2 Combo Result: Nonreact (10-10-24 @ 21:13)    Lactate Dehydrogenase, Serum: 300 (10-11)    Blood Gas Venous - Lactate: 2.5 (10-11 @ 07:45)  Blood Gas Venous - Lactate: 3.3 (10-10 @ 23:37)  Blood Gas Venous - Lactate: 3.9 (10-10 @ 19:34)      RADIOLOGY:  imaging below personally reviewed    < from: CT Abdomen and Pelvis w/ IV Cont (10.11.24 @ 07:22) >  FINDINGS:  LOWER CHEST: Bilateral lower lobe subpleural linear/consolidative   opacities, likely atelectasis.    LIVER: Within normal limits.  BILE DUCTS: Normal caliber.  GALLBLADDER: Within normal limits.  SPLEEN: Splenomegaly, [id number] cm in the AP dimension.  PANCREAS: Within normal limits.  ADRENALS: Within normal limits.  KIDNEYS/URETERS: Bilaterally symmetric renal enhancement pattern. No   hydronephrosis. No nephrolithiasis.    BLADDER: Within normal limits.  REPRODUCTIVEORGANS: Prostate within normal limits.    BOWEL: No bowel obstruction. Appendix is normal.  PERITONEUM/RETROPERITONEUM: Within normal limits.  VESSELS: Within normal limits.  LYMPH NODES: No lymphadenopathy.  ABDOMINAL WALL: Small fat-containing umbilical hernia.  BONES: Within normal limits.    IMPRESSION:  No acute pathology.  Splenomegaly.      < from: Xray Chest 2 Views PA/Lat (10.10.24 @ 20:16) >  FINDINGS:    The heart is normal in size.  The lungs are clear.  There is no pneumothorax or pleural effusion.    IMPRESSION:  Clear lungs.

## 2024-10-11 NOTE — CONSULT NOTE ADULT - ASSESSMENT
24 year old man no PMH presenting with 5 days of fevers, body aches, chills. Associated vomiting and dark-colored urine He recently returned from [place] 2 weeks ago, no malaria prophylaxis taken. ID consulted for malaria.    Patient was recently in [place] for 2 weeks and returned 2 weeks ago. While there he attended soccer games, ate out at restaurants, and spent time with his family in the house. He started feeling sick on 10/6 upon [place] the [place]. He has been to [place] many times over his life and has never had malaria before. He did not take prophylaxis prior to travel. He did not note any mosquito bites while there but also did not use bed net or bug sprays, but noted he slept with the A/C on.    Febrile to 101.2 on admission, tachycardia  Labs without leukocytosis or anemia, however 33% bands with severe thrombocytopenia present. NADEGE, hyperbilirubinemia, decreased haptoglobin, and elevated LDH  Blood positive for P. falciparum antigen, parasitemia 8%  HIV negative  CT a/p with splenomegaly without other acute pathology  CXR clear lungs  UA no pyuria, +blood  Blood cultures pending    #Severe P. falciparum malaria  #Sepsis  #NADEGE  #Thrombocytopenia    - will order artesunate  - full recs to follow 24 year old man no PMH presenting with 5 days of fevers, body aches, chills. Associated vomiting and dark-colored urine He recently returned from [place] 2 weeks ago, no malaria prophylaxis taken. ID consulted for malaria.    Patient was recently in [place] for 2 weeks and returned 2 weeks ago. While there he attended soccer games, ate out at restaurants, and spent time with his family in the house. He started feeling sick on 10/6 upon [place] to the [place]. He has been to [place] many times over his life and has never had malaria before. He did not take prophylaxis prior to travel. He did not note any mosquito bites while there but also did not use bed net or bug sprays, but noted he slept with the A/C on.    Febrile to 101.2 on admission, tachycardia  Labs without leukocytosis or anemia, however 33% bands with severe thrombocytopenia present. NADEGE, hyperbilirubinemia, decreased haptoglobin, and elevated LDH  Blood positive for P. falciparum antigen, parasitemia 8%  HIV negative  CT a/p with splenomegaly without other acute pathology  CXR clear lungs  UA no pyuria, +blood  Blood cultures pending    #Severe P. falciparum malaria  #Sepsis  #NADEGE  #Thrombocytopenia    - IV artesunate 220 mg q12h for 3 doses  - following 3rd artesunate dose, check parasitemia level  - if parasitemia level >1%, use artesunate 220 mg q24h and repeat parasitemia level daily until <1%  - when parasitemia level <1%, switch to coartem 80/480 BID for 3 days  - for now, check CBC, CMP, VBG q6h to monitor for anemia, NADEGE, hypoglycemia, acidosis    d/w attending.    Samson Meza, PGY5  ID Fellow  [employer] Preferred  After 5pm/weekends call [phone] 24 year old man no PMH presenting with 5 days of fevers, body aches, chills. Associated vomiting and dark-colored urine He recently returned from [place] 2 weeks ago, no malaria prophylaxis taken. ID consulted for malaria.    Patient was recently in [place] for 2 weeks and returned 2 weeks ago. While there he attended soccer games, ate out at restaurants, and spent time with his family in the house. He started feeling sick on 10/[place] to the [place]. He has been to [place] many times over his life and has never had malaria before. He did not take prophylaxis prior to travel. He did not note any mosquito bites while there but also did not use bed net or bug sprays, but noted he slept with the A/C on.    Febrile to 101.2 on admission, tachycardia  Labs without leukocytosis or anemia, however 33% bands with severe thrombocytopenia present. NADEGE, hyperbilirubinemia, decreased haptoglobin, and elevated LDH  Blood positive for P. falciparum antigen, parasitemia 8%  HIV negative  CT a/p with splenomegaly without other acute pathology  CXR clear lungs  UA no pyuria, +blood  Blood cultures pending    #Severe P. falciparum malaria  #Sepsis  #NADEGE  #Thrombocytopenia    - IV artesunate 220 mg q12h for 3 doses  - following 3rd artesunate dose, check parasitemia level  - if parasitemia level >1%, use artesunate 220 mg q24h and repeat parasitemia level daily until <1%  - when parasitemia level <1%, switch to coartem 80/480 BID for 3 days  - for now, check CBC, BMP, VBG q6h to monitor for anemia, NADEGE, hypoglycemia, acidosis    d/w attending.    Samson Meza, PGY5  ID Fellow  [employer] Preferred  After 5pm/weekends call [phone]

## 2024-10-11 NOTE — H&P ADULT - ATTENDING COMMENTS
24M with no significant PMHx presenting to the emergency department with 5 days of recurrent fevers following travel to [place] two weeks ago now with BCx positive for Plasmodium falciparum admitted for treatment of severe malaria.    #severe malaria  #severe sepsis  #NADEGE  #thrombocytopenia  #splenomegaly  - recent trip   - ID recs appreciated  - IV artesunate  - NADEGE likely pre-renal in the setting of sepsis, s/p fluids in ED. monitor Cr  - monitor CMP 24M with no significant PMHx presenting to the emergency department with 5 days of recurrent fevers following travel to [place] two weeks ago now with BCx positive for Plasmodium falciparum admitted for treatment of severe malaria.    #severe malaria  #severe sepsis  #NADEGE  #thrombocytopenia  #splenomegaly  - recent trip   - ID recs appreciated  - IV artesunate  - NADEGE likely pre-renal in the setting of sepsis, s/p fluids in ED. monitor Cr  - monitor CMP  - transfuse prn

## 2024-10-11 NOTE — CONSULT NOTE ADULT - ATTENDING COMMENTS
24 year old man no PMH presenting with 5 days of fevers, body aches, chills. Associated vomiting and dark-colored urine He recently returned from [place] 2 weeks ago, no malaria prophylaxis taken. ID consulted for malaria.    Patient was recently in [place] for 2 weeks and returned 2 weeks ago. While there he attended soccer games, ate out at restaurants, and spent time with his family in the house. He started feeling sick on 10/6 [place] to the [place] He has been to [place] many times over his life and has never had malaria before. He did not take prophylaxis prior to travel. He did not note any mosquito bites while there but also did not use bed net or bug sprays, but noted he slept with the A/C on.    Febrile to 101.2 on admission, tachycardia  Labs without leukocytosis or anemia, however 33% bands with severe thrombocytopenia present. NADEGE, hyperbilirubinemia, decreased haptoglobin, and elevated LDH  Blood positive for P. falciparum antigen, parasitemia 8%  HIV negative  CT a/p with splenomegaly without other acute pathology  CXR clear lungs  UA no pyuria, +blood  Blood cultures pending    #Severe P. falciparum malaria  #Sepsis  #NADEGE, hyperbilirubinemia, lactic acidosis   #Thrombocytopenia      - IV artesunate 220 mg q12h for 3 doses  - following 3rd artesunate dose, check parasitemia level, 4 hours after the 3rd dose of Artesunate   - if parasitemia level >1%, use artesunate 220 mg q24h and repeat parasitemia level daily until <1%  - when parasitemia level <1%, switch to coartem 80/480 BID for 3 days  - for now, check CBC, BMP, VBG q6h to monitor for anemia, NADEGE, hypoglycemia, acidosis  - follow up blood cx   - urine cx in lab     Plan discussed with Medicine    Greta Dent  Please contact through [employer] Teams   If no response or past 5 pm/weekend call [phone]. 24 year old man no PMH presenting with 5 days of fevers, body aches, chills. Associated vomiting and dark-colored urine He recently returned from [place] 2 weeks ago, no malaria prophylaxis taken. ID consulted for malaria.    Patient was recently in [place] for 2 weeks and returned 2 weeks ago. While there he attended soccer games, ate out at restaurants, and spent time with his family in the house. He started feeling sick on 10/[place] upon [place] to the [place]. He has been to [place] many times over his life and has never had malaria before. He did not take prophylaxis prior to travel. He did not note any mosquito bites while there but also did not use bed net or bug sprays, but noted he slept with the A/C on.    Febrile to 101.2 on admission, tachycardia  Labs without leukocytosis or anemia, however 33% bands with severe thrombocytopenia present. NADEGE, hyperbilirubinemia, decreased haptoglobin, and elevated LDH  Blood positive for P. falciparum antigen, parasitemia 8%  HIV negative  CT a/p with splenomegaly without other acute pathology  CXR clear lungs  UA no pyuria, +blood  Blood cultures pending    #Severe P. falciparum malaria  #Sepsis  #NADEGE, hyperbilirubinemia, lactic acidosis   #Thrombocytopenia      - IV artesunate 220 mg q12h for 3 doses  - following 3rd artesunate dose, check parasitemia level, 4 hours after the 3rd dose of Artesunate   - if parasitemia level >1%, use artesunate 220 mg q24h and repeat parasitemia level daily until <1%  - when parasitemia level <1%, switch to coartem 80/480 BID for 3 days  - for now, check CBC, BMP, VBG q6h to monitor for anemia, NADEGE, hypoglycemia, acidosis  - follow up blood cx   - urine cx in lab     Plan discussed with Medicine    Greta Dent  Please contact through [employer] Teams   If no response or past 5 pm/weekend call [phone].    My colleague will cover starting 10/12.

## 2024-10-11 NOTE — H&P ADULT - PROBLEM SELECTOR PLAN 3
- consent for transfusion  - trend WBC, Hgb, Plt, coags  - trend Tbili, Dbili  - trend haptoglobin, LDH likely consumptive with hemolysis 2/2 falciparum infection    - consent for transfusion  - trend WBC, Hgb, Plt, coags  - trend Tbili, Dbili  - trend haptoglobin, LDH

## 2024-10-11 NOTE — H&P ADULT - NSHPLABSRESULTS_GEN_ALL_CORE
LABS:                        13.7   7.29  )-----------( 19       ( 11 Oct 2024 07:45 )             39.9     WBC trend: 7.29 <--, 8.44 <--    Manual Differential (10.11.24 @ 07:45)   Giant Platelets: Present  Poikilocytosis: Slight  Red Cell Morphology: Normal  Platelet Morphology: Normal: PLT: Giant Platelets Present.  Reactive Lymphocytes %: 2.6 %  Platelet Count - Estimate: Decreased  Band Neutrophils %: 33.0 %  Metamyelocytes %: 0.9 %  Smudge Cells: Present      10-11    135  |  102  |  19  ----------------------------<  88  3.6   |  23  |  1.31[H]    Ca    7.3[L]      11 Oct 2024 07:45    TPro  4.9[L]  /  Alb  3.0[L]  /  TBili  4.8[H]  /  DBili  3.5[H]  /  AST  34  /  ALT  31  /  AlkPhos  49  10-11    Bilirubin - Total and Direct (10.11.24 @ 07:45)   Indirect Reacting Bilirubin: 1.3 mg/dL  Bilirubin Total: 4.8 mg/dL  Bilirubin Direct: 3.5 mg/dL    Creatinine trend: 1.31<--, 1.65<--    Lactate Dehydrogenase, Serum (10.11.24 @ 07:45)   Lactate Dehydrogenase, Serum: 300 U/L      Haptoglobin, Serum (10.11.24 @ 07:45)   Haptoglobin, Serum: 27 mg/dL      PT/INR - ( 11 Oct 2024 07:45 )   PT: 17.7 sec;   INR: 1.49 ratio    PTT - ( 11 Oct 2024 07:45 )  PTT:36.4 sec      Urinalysis Basic - ( 11 Oct 2024 07:45 )    Color: x / Appearance: x / SG: x / pH: x  Gluc: 88 mg/dL / Ketone: x  / Bili: x / Urobili: x   Blood: x / Protein: x / Nitrite: x   Leuk Esterase: x / RBC: x / WBC x   Sq Epi: x / Non Sq Epi: x / Bacteria: x      FluA/FluB/RSV/COVID PCR (10.10.24 @ 19:46)  SARS-CoV-2: NotDetec  Influenza A Result: NotDetec  Influenza B Result: NotDetec  Resp Syn Virus Result: NotDetec      Blood Gas Profile - Venous (10.11.24 @ 07:45)   pH, Venous: 7.30  pCO2, Venous: 45 mmHg  pO2, Venous: 39 mmHg  HCO3, Venous: 22 mmol/L  Base Excess, Venous: -4.4 mmol/L  Oxygen Saturation, Venous: 62.9 %  Total CO2, Venous: 24 mmol/L  Blood Gas Source Venous: Venous    Blood Gas Venous - Lactate (10.11.24 @ 07:45)   Blood Gas Venous - Lactate: 2.5: Elevated lactate.      ORGANISMS:    Blood Parasites- Travel Associated . (10.11.24 @ 08:02)   Specimen Source: .Blood BLOOD  Culture Results:   Positive for Plasmodium falciparum   by Giemsa Stain   Parasitemia = 8 %   ************************************************************   POSITIVE for Plasmodium falciparum antigen. In rare cases this may   represent a mixed infection. Microscopy is performed for confirmation and   parasitemia.      IMAGING:  < from: CT Abdomen and Pelvis w/ IV Cont (10.11.24 @ 07:22) >   < from: CT Abdomen and Pelvis w/ IV Cont (10.11.24 @ 07:22) >    IMPRESSION:  No acute pathology.  Splenomegaly 14.6cm in AP dimension

## 2024-10-12 LAB
ALBUMIN SERPL ELPH-MCNC: 3.1 G/DL — LOW (ref 3.3–5)
ALBUMIN SERPL ELPH-MCNC: 3.4 G/DL — SIGNIFICANT CHANGE UP (ref 3.3–5)
ALP SERPL-CCNC: 59 U/L — SIGNIFICANT CHANGE UP (ref 40–120)
ALP SERPL-CCNC: 59 U/L — SIGNIFICANT CHANGE UP (ref 40–120)
ALT FLD-CCNC: 32 U/L — SIGNIFICANT CHANGE UP (ref 4–41)
ALT FLD-CCNC: 34 U/L — SIGNIFICANT CHANGE UP (ref 4–41)
ANION GAP SERPL CALC-SCNC: 11 MMOL/L — SIGNIFICANT CHANGE UP (ref 7–14)
ANION GAP SERPL CALC-SCNC: 13 MMOL/L — SIGNIFICANT CHANGE UP (ref 7–14)
APTT BLD: 36.9 SEC — HIGH (ref 24.5–35.6)
AST SERPL-CCNC: 47 U/L — HIGH (ref 4–40)
AST SERPL-CCNC: 85 U/L — HIGH (ref 4–40)
BILIRUB SERPL-MCNC: 2.3 MG/DL — HIGH (ref 0.2–1.2)
BILIRUB SERPL-MCNC: 4.5 MG/DL — HIGH (ref 0.2–1.2)
BUN SERPL-MCNC: 12 MG/DL — SIGNIFICANT CHANGE UP (ref 7–23)
BUN SERPL-MCNC: 15 MG/DL — SIGNIFICANT CHANGE UP (ref 7–23)
CALCIUM SERPL-MCNC: 8.1 MG/DL — LOW (ref 8.4–10.5)
CALCIUM SERPL-MCNC: 8.5 MG/DL — SIGNIFICANT CHANGE UP (ref 8.4–10.5)
CHLORIDE SERPL-SCNC: 100 MMOL/L — SIGNIFICANT CHANGE UP (ref 98–107)
CHLORIDE SERPL-SCNC: 101 MMOL/L — SIGNIFICANT CHANGE UP (ref 98–107)
CO2 SERPL-SCNC: 21 MMOL/L — LOW (ref 22–31)
CO2 SERPL-SCNC: 23 MMOL/L — SIGNIFICANT CHANGE UP (ref 22–31)
CREAT SERPL-MCNC: 1.18 MG/DL — SIGNIFICANT CHANGE UP (ref 0.5–1.3)
CREAT SERPL-MCNC: 1.34 MG/DL — HIGH (ref 0.5–1.3)
CULTURE RESULTS: NO GROWTH — SIGNIFICANT CHANGE UP
EGFR: 75 ML/MIN/1.73M2 — SIGNIFICANT CHANGE UP
EGFR: 88 ML/MIN/1.73M2 — SIGNIFICANT CHANGE UP
GAS PNL BLDV: SIGNIFICANT CHANGE UP
GLUCOSE SERPL-MCNC: 101 MG/DL — HIGH (ref 70–99)
GLUCOSE SERPL-MCNC: 98 MG/DL — SIGNIFICANT CHANGE UP (ref 70–99)
HCT VFR BLD CALC: 40.7 % — SIGNIFICANT CHANGE UP (ref 39–50)
HCT VFR BLD CALC: 42.4 % — SIGNIFICANT CHANGE UP (ref 39–50)
HGB BLD-MCNC: 14.4 G/DL — SIGNIFICANT CHANGE UP (ref 13–17)
HGB BLD-MCNC: 14.6 G/DL — SIGNIFICANT CHANGE UP (ref 13–17)
INR BLD: 1.19 RATIO — HIGH (ref 0.85–1.16)
MAGNESIUM SERPL-MCNC: 1.9 MG/DL — SIGNIFICANT CHANGE UP (ref 1.6–2.6)
MAGNESIUM SERPL-MCNC: 1.9 MG/DL — SIGNIFICANT CHANGE UP (ref 1.6–2.6)
MCHC RBC-ENTMCNC: 28.7 PG — SIGNIFICANT CHANGE UP (ref 27–34)
MCHC RBC-ENTMCNC: 29.4 PG — SIGNIFICANT CHANGE UP (ref 27–34)
MCHC RBC-ENTMCNC: 34.4 GM/DL — SIGNIFICANT CHANGE UP (ref 32–36)
MCHC RBC-ENTMCNC: 35.4 GM/DL — SIGNIFICANT CHANGE UP (ref 32–36)
MCV RBC AUTO: 83.1 FL — SIGNIFICANT CHANGE UP (ref 80–100)
MCV RBC AUTO: 83.5 FL — SIGNIFICANT CHANGE UP (ref 80–100)
NRBC # BLD: 0 /100 WBCS — SIGNIFICANT CHANGE UP (ref 0–0)
NRBC # BLD: 0 /100 WBCS — SIGNIFICANT CHANGE UP (ref 0–0)
NRBC # FLD: 0 K/UL — SIGNIFICANT CHANGE UP (ref 0–0)
NRBC # FLD: 0 K/UL — SIGNIFICANT CHANGE UP (ref 0–0)
PHOSPHATE SERPL-MCNC: 3 MG/DL — SIGNIFICANT CHANGE UP (ref 2.5–4.5)
PHOSPHATE SERPL-MCNC: 3.7 MG/DL — SIGNIFICANT CHANGE UP (ref 2.5–4.5)
PLATELET # BLD AUTO: 29 K/UL — LOW (ref 150–400)
PLATELET # BLD AUTO: 41 K/UL — LOW (ref 150–400)
POTASSIUM SERPL-MCNC: 3.7 MMOL/L — SIGNIFICANT CHANGE UP (ref 3.5–5.3)
POTASSIUM SERPL-MCNC: 3.8 MMOL/L — SIGNIFICANT CHANGE UP (ref 3.5–5.3)
POTASSIUM SERPL-SCNC: 3.7 MMOL/L — SIGNIFICANT CHANGE UP (ref 3.5–5.3)
POTASSIUM SERPL-SCNC: 3.8 MMOL/L — SIGNIFICANT CHANGE UP (ref 3.5–5.3)
PROT SERPL-MCNC: 5.9 G/DL — LOW (ref 6–8.3)
PROT SERPL-MCNC: 6.1 G/DL — SIGNIFICANT CHANGE UP (ref 6–8.3)
PROTHROM AB SERPL-ACNC: 13.8 SEC — HIGH (ref 9.9–13.4)
RBC # BLD: 4.9 M/UL — SIGNIFICANT CHANGE UP (ref 4.2–5.8)
RBC # BLD: 5.08 M/UL — SIGNIFICANT CHANGE UP (ref 4.2–5.8)
RBC # FLD: 13 % — SIGNIFICANT CHANGE UP (ref 10.3–14.5)
RBC # FLD: 13.2 % — SIGNIFICANT CHANGE UP (ref 10.3–14.5)
SODIUM SERPL-SCNC: 134 MMOL/L — LOW (ref 135–145)
SODIUM SERPL-SCNC: 135 MMOL/L — SIGNIFICANT CHANGE UP (ref 135–145)
SPECIMEN SOURCE: SIGNIFICANT CHANGE UP
WBC # BLD: 5.08 K/UL — SIGNIFICANT CHANGE UP (ref 3.8–10.5)
WBC # BLD: 6.24 K/UL — SIGNIFICANT CHANGE UP (ref 3.8–10.5)
WBC # FLD AUTO: 5.08 K/UL — SIGNIFICANT CHANGE UP (ref 3.8–10.5)
WBC # FLD AUTO: 6.24 K/UL — SIGNIFICANT CHANGE UP (ref 3.8–10.5)

## 2024-10-12 PROCEDURE — 99232 SBSQ HOSP IP/OBS MODERATE 35: CPT

## 2024-10-12 RX ORDER — OXYCODONE HYDROCHLORIDE 30 MG/1
5 TABLET, FILM COATED, EXTENDED RELEASE ORAL ONCE
Refills: 0 | Status: DISCONTINUED | OUTPATIENT
Start: 2024-10-12 | End: 2024-10-12

## 2024-10-12 RX ORDER — SODIUM CHLORIDE IRRIG SOLUTION 0.9 %
1000 SOLUTION, IRRIGATION IRRIGATION
Refills: 0 | Status: DISCONTINUED | OUTPATIENT
Start: 2024-10-12 | End: 2024-10-14

## 2024-10-12 RX ORDER — SODIUM PHOSPHATE IN D5W 15MMOL/250
30 PLASTIC BAG, INJECTION (ML) INTRAVENOUS ONCE
Refills: 0 | Status: DISCONTINUED | OUTPATIENT
Start: 2024-10-12 | End: 2024-10-12

## 2024-10-12 RX ADMIN — ARTESUNATE 220 MILLIGRAM(S): KIT at 02:37

## 2024-10-12 RX ADMIN — Medication 75 MILLILITER(S): at 12:17

## 2024-10-12 RX ADMIN — OXYCODONE HYDROCHLORIDE 5 MILLIGRAM(S): 30 TABLET, FILM COATED, EXTENDED RELEASE ORAL at 13:44

## 2024-10-12 RX ADMIN — ARTESUNATE 220 MILLIGRAM(S): KIT at 14:12

## 2024-10-12 RX ADMIN — OXYCODONE HYDROCHLORIDE 5 MILLIGRAM(S): 30 TABLET, FILM COATED, EXTENDED RELEASE ORAL at 12:44

## 2024-10-12 NOTE — PROGRESS NOTE ADULT - SUBJECTIVE AND OBJECTIVE BOX
Patient is a 24y old  Male who presents with a chief complaint of Fevers (11 Oct 2024 13:13)      SUBJECTIVE / OVERNIGHT EVENTS:  Patient seen and evaluated at bedside. He states he had a fever last night, was febrile to 103. Given tylenol with resolution of fever. Later in the AM, had 10/10 headache, full neuro exam without deficits, provided 5mg oxycodone. Denies CP or SOB.      REVIEW OF SYSTEMS:    CONSTITUTIONAL:  No weakness or chills  EYES/ENT:  No visual changes;  No vertigo or throat pain   NECK:  No pain or stiffness  RESPIRATORY:  No cough, wheezing, hemoptysis; No shortness of breath  CARDIOVASCULAR:  No chest pain or palpitations  GASTROINTESTINAL:  No abdominal or epigastric pain. No nausea, vomiting, or hematemesis; No diarrhea or constipation. No melena or hematochezia.  GENITOURINARY:  No dysuria, frequency or hematuria  NEUROLOGICAL:  No numbness or weakness  SKIN:  No itching, rashes        acetaminophen     Tablet .. 650 milliGRAM(s) Oral every 6 hours PRN Temp greater or equal to 38C (100.4F), Mild Pain (1 - 3)  artesunate Injectable 220 milliGRAM(s) IV Push every 12 hours  influenza   Vaccine 0.5 milliLiter(s) IntraMuscular once  lactated ringers. 1000 milliLiter(s) IV Continuous <Continuous>  melatonin 3 milliGRAM(s) Oral at bedtime PRN Insomnia        Vital Signs Last 24 Hrs  T(C): 36.8 (12 Oct 2024 14:16), Max: 39.5 (11 Oct 2024 22:14)  T(F): 98.3 (12 Oct 2024 14:16), Max: 103.1 (11 Oct 2024 22:14)  HR: 113 (12 Oct 2024 14:16) (82 - 122)  BP: 106/63 (12 Oct 2024 14:16) (101/60 - 108/65)  BP(mean): --  RR: 19 (12 Oct 2024 14:16) (18 - 19)  SpO2: 99% (12 Oct 2024 14:16) (97% - 100%)    Parameters below as of 12 Oct 2024 14:16  Patient On (Oxygen Delivery Method): room air      GENERAL: NAD, lying in bed comfortably, conversational  HEAD:  Atraumatic, normocephalic  EYES: EOMI, PERRLA, icteric sclera  NECK: Supple, trachea midline, no JVD  HEART: +S1/S2, RRR, no murmurs, rubs, or gallops  LUNGS: Unlabored respirations. CTA b/l, no crackles, wheezing, or rhonchi  ABDOMEN: Soft, NTND, +BS. No masses or hernia palpated  EXTREMITIES: 2+ peripheral pulses bilaterally. No clubbing, cyanosis, or edema  MSK: no gross deformity in extremities, no step off or deformity in C/T/L spine, normal muscle strength/tone  NERVOUS SYSTEM:  A&Ox3, moving all extremities spontaneously, sensation intact and equal in b/l extremities  SKIN: No rashes, lesions, bites, or discoloration        LABS:                        14.6   6.24  )-----------( 29       ( 12 Oct 2024 06:16 )             42.4     Hgb Trend: 14.6<--, 14.6<--, 15.1<--, 13.7<--, 17.2<--  10-12    135  |  101  |  15  ----------------------------<  101[H]  3.8   |  23  |  1.34[H]    Ca    8.5      12 Oct 2024 06:16  Phos  3.7     10-12  Mg     1.90     10-12    TPro  6.1  /  Alb  3.4  /  TBili  4.5[H]  /  DBili  x   /  AST  47[H]  /  ALT  34  /  AlkPhos  59  10-12    Creatinine Trend: 1.34<--, 1.21<--, 1.25<--, 1.31<--, 1.65<--      LIVER FUNCTIONS - ( 12 Oct 2024 06:16 )  Alb: 3.4 g/dL / Pro: 6.1 g/dL / ALK PHOS: 59 U/L / ALT: 34 U/L / AST: 47 U/L / GGT: x           PT/INR - ( 12 Oct 2024 06:16 )   PT: 13.8 sec;   INR: 1.19 ratio         PTT - ( 12 Oct 2024 06:16 )  PTT:36.9 sec  Urinalysis Basic - ( 12 Oct 2024 06:16 )    Color: x / Appearance: x / SG: x / pH: x  Gluc: 101 mg/dL / Ketone: x  / Bili: x / Urobili: x   Blood: x / Protein: x / Nitrite: x   Leuk Esterase: x / RBC: x / WBC x   Sq Epi: x / Non Sq Epi: x / Bacteria: x

## 2024-10-12 NOTE — PROVIDER CONTACT NOTE (OTHER) - BACKGROUND
Pt calling in stating she started the increase dose of metoprolol 100mg daily 2 days ago and states when ever she walks or exerts herself she feels a fullness in her stomach and chest. It feels like she drank a lot of water.   Reviewed with Елена Leger CNP. Recent stress test and echo normal. Not typical or known symptoms for metoprolol.   Pt states she had this feeling even on lower dose but seems to have gotten worse.  Per Елена pt to stop Metoprolol and switch to Diltiazem 240mg a day.   New rx sent to pharmacy. Pt will follow up as planned with holter monitor placed next week and follow up in clinic on 11/15/23.    Ann Ortega RN     Patient admitted for fever due to unspecified condition.

## 2024-10-12 NOTE — PROVIDER CONTACT NOTE (OTHER) - ASSESSMENT
M to please call or message via [employer] regarding GH.   America Gold, RN on 11/1/2022 at 9:17 AM       RE    Subject: Late To Fill - Nutropin                           Hello,     [facility] has been unable to reach this patient to refill their medication.  According to our records, the patient is overdue to refill and adherence may be an issue at this point.     Medication and strength: Nutropin AQ Nuspin 10 10 SOPN   Last fill date and day supply: Sent 1mo supply on 09/13/2022     If the medication is on hold, been changed, discontinued, sent to a different pharmacy, or any other information is available, please reply or contact us at [phone].     Thank you,     [facility]      
Patient in no acute distress. /60, RR 19, O2 100.

## 2024-10-12 NOTE — PROGRESS NOTE ADULT - SUBJECTIVE AND OBJECTIVE BOX
Patient is a 24y old  Male who presents with a chief complaint of Fevers (12 Oct 2024 15:26)    Being followed by ID for    Interval history:  febrile overnight to 103 F  Reports headaches since morning,  no neck stiffness, no FND on examination today   Plat continue to improve   Mother at the bedside today     ROS:  Headaches,   No neck stiffness     Antimicrobials:  artesunate Injectable 220 milliGRAM(s) IV Push every 12 hours      Vital Signs Last 24 Hrs  T(C): 36.8 (10-12-24 @ 14:16), Max: 39.5 (10-11-24 @ 22:14)  T(F): 98.3 (10-12-24 @ 14:16), Max: 103.1 (10-11-24 @ 22:14)  HR: 113 (10-12-24 @ 14:16) (82 - 122)  BP: 106/63 (10-12-24 @ 14:16) (101/60 - 108/65)  BP(mean): --  RR: 19 (10-12-24 @ 14:16) (18 - 19)  SpO2: 99% (10-12-24 @ 14:16) (97% - 100%)    Physical Exam:  General: Patient in no acute distress  Eyes: +icterus  ENT: No oral ulcers   CV: S1+S2, normal   Lungs: No respiratory distress, CTAB  Abd: Soft, nontender  Ext: No edema  Neuro: Alert and oriented  Skin: No rash      Lab Data:                        14.4   5.08  )-----------( 41       ( 12 Oct 2024 15:39 )             40.7     10-12    134[L]  |  100  |  12  ----------------------------<  98  3.7   |  21[L]  |  1.18    Ca    8.1[L]      12 Oct 2024 15:39  Phos  3.0     10-12  Mg     1.90     10-12    TPro  5.9[L]  /  Alb  3.1[L]  /  TBili  2.3[H]  /  DBili  x   /  AST  85[H]  /  ALT  32  /  AlkPhos  59  10-12      .Blood BLOOD  10-11-24   Positive for Plasmodium falciparum  by Giemsa Stain  Parasitemia = 8 %    ************************************************************  POSITIVE for Plasmodium falciparum antigen. In rare cases this may  represent a mixed infection. Microscopy is performed for confirmation and  parasitemia.  --  --      Clean Catch  10-11-24   No growth  --  --      .Blood BLOOD  10-10-24   No growth at 24 hours  --  --      .Blood BLOOD  10-10-24   No growth at 24 hours  --  --                  RADIOLOGY:  below reviewed    ACC: [id number] EXAM:  CT ABDOMEN AND PELVIS IC   ORDERED BY: JACQUELIN KRISHNAN     PROCEDURE DATE:  10/11/2024          INTERPRETATION:  CLINICAL INFORMATION: Hyperbilirubinemia. Sepsis. Recent   travel to [place]    COMPARISON: None.    CONTRAST/COMPLICATIONS:  IV Contrast: Omnipaque 350  90 cc administered   10 cc discarded  Oral Contrast: NONE  Complications: None reported at time of study completion    PROCEDURE:  CT of the Abdomen and Pelvis was performed.  Sagittal and coronal reformats were performed.    FINDINGS:  LOWER CHEST: Bilateral lower lobe subpleural linear/consolidative   opacities, likely atelectasis.    LIVER: Within normal limits.  BILE DUCTS: Normal caliber.  GALLBLADDER: Within normal limits.  SPLEEN: Splenomegaly, measuring 14.6 cm in the AP dimension.  PANCREAS: Within normal limits.  ADRENALS: Within normal limits.  KIDNEYS/URETERS: Bilaterally symmetric renal enhancement pattern. No   hydronephrosis. No nephrolithiasis.    BLADDER: Within normal limits.  REPRODUCTIVE ORGANS: Prostate within normal limits.    BOWEL: No bowel obstruction. Appendix is normal.  PERITONEUM/RETROPERITONEUM: Within normal limits.  VESSELS: Within normal limits.  LYMPH NODES: No lymphadenopathy.  ABDOMINAL WALL: Small fat-containing umbilical hernia.  BONES: Within normal limits.    IMPRESSION:  No acute pathology.  Splenomegaly.        --- End of Report ---

## 2024-10-12 NOTE — PROGRESS NOTE ADULT - PROBLEM SELECTOR PLAN 1
Patient meets severe criteria (must meet at least 2 criteria):  - Tbili > 3mg/dL (7.3 on admission) PLUS parasitemia > 2.5% (8% on BCx)  - Parasitemia > 5% (8% on BCx)  - BUN > 20 (20 on admission)      Plan:  - IV Artesunate 220mg q12  for total 3 doses  -- First dose 10/11 at 2PM  -- Second dose 10/12 at 2AM  -- Third dose 10/12 at 2 PM  -- 3 dose series completed -> f/u parasite count    - q6 CBC, CMP to trend Hgb, Plt, serum glucose, T/Dbili  - Repeat parasite count 24hr from first dose (2PM on 10/12) -> see ID consult note  - ID recs appreciated  - s/p zosyn x1 in ED

## 2024-10-13 LAB
ALBUMIN SERPL ELPH-MCNC: 3 G/DL — LOW (ref 3.3–5)
ALBUMIN SERPL ELPH-MCNC: 3.2 G/DL — LOW (ref 3.3–5)
ALBUMIN SERPL ELPH-MCNC: 3.2 G/DL — LOW (ref 3.3–5)
ALBUMIN SERPL ELPH-MCNC: 3.4 G/DL — SIGNIFICANT CHANGE UP (ref 3.3–5)
ALP SERPL-CCNC: 60 U/L — SIGNIFICANT CHANGE UP (ref 40–120)
ALP SERPL-CCNC: 61 U/L — SIGNIFICANT CHANGE UP (ref 40–120)
ALP SERPL-CCNC: 63 U/L — SIGNIFICANT CHANGE UP (ref 40–120)
ALP SERPL-CCNC: 66 U/L — SIGNIFICANT CHANGE UP (ref 40–120)
ALT FLD-CCNC: 32 U/L — SIGNIFICANT CHANGE UP (ref 4–41)
ALT FLD-CCNC: 37 U/L — SIGNIFICANT CHANGE UP (ref 4–41)
ALT FLD-CCNC: 44 U/L — HIGH (ref 4–41)
ALT FLD-CCNC: 45 U/L — HIGH (ref 4–41)
ANION GAP SERPL CALC-SCNC: 10 MMOL/L — SIGNIFICANT CHANGE UP (ref 7–14)
ANION GAP SERPL CALC-SCNC: 11 MMOL/L — SIGNIFICANT CHANGE UP (ref 7–14)
ANION GAP SERPL CALC-SCNC: 12 MMOL/L — SIGNIFICANT CHANGE UP (ref 7–14)
ANION GAP SERPL CALC-SCNC: 9 MMOL/L — SIGNIFICANT CHANGE UP (ref 7–14)
AST SERPL-CCNC: 59 U/L — HIGH (ref 4–40)
AST SERPL-CCNC: 61 U/L — HIGH (ref 4–40)
AST SERPL-CCNC: 67 U/L — HIGH (ref 4–40)
AST SERPL-CCNC: 69 U/L — HIGH (ref 4–40)
BASE EXCESS BLDV CALC-SCNC: 0.3 MMOL/L — SIGNIFICANT CHANGE UP (ref -2–3)
BASE EXCESS BLDV CALC-SCNC: 3.2 MMOL/L — HIGH (ref -2–3)
BILIRUB SERPL-MCNC: 1.1 MG/DL — SIGNIFICANT CHANGE UP (ref 0.2–1.2)
BILIRUB SERPL-MCNC: 1.4 MG/DL — HIGH (ref 0.2–1.2)
BILIRUB SERPL-MCNC: 1.4 MG/DL — HIGH (ref 0.2–1.2)
BILIRUB SERPL-MCNC: 1.5 MG/DL — HIGH (ref 0.2–1.2)
BUN SERPL-MCNC: 10 MG/DL — SIGNIFICANT CHANGE UP (ref 7–23)
BUN SERPL-MCNC: 9 MG/DL — SIGNIFICANT CHANGE UP (ref 7–23)
CALCIUM SERPL-MCNC: 8.1 MG/DL — LOW (ref 8.4–10.5)
CALCIUM SERPL-MCNC: 8.2 MG/DL — LOW (ref 8.4–10.5)
CALCIUM SERPL-MCNC: 8.4 MG/DL — SIGNIFICANT CHANGE UP (ref 8.4–10.5)
CALCIUM SERPL-MCNC: 8.8 MG/DL — SIGNIFICANT CHANGE UP (ref 8.4–10.5)
CHLORIDE SERPL-SCNC: 100 MMOL/L — SIGNIFICANT CHANGE UP (ref 98–107)
CHLORIDE SERPL-SCNC: 100 MMOL/L — SIGNIFICANT CHANGE UP (ref 98–107)
CHLORIDE SERPL-SCNC: 102 MMOL/L — SIGNIFICANT CHANGE UP (ref 98–107)
CHLORIDE SERPL-SCNC: 102 MMOL/L — SIGNIFICANT CHANGE UP (ref 98–107)
CO2 BLDV-SCNC: 27 MMOL/L — HIGH (ref 22–26)
CO2 BLDV-SCNC: 32 MMOL/L — HIGH (ref 22–26)
CO2 SERPL-SCNC: 22 MMOL/L — SIGNIFICANT CHANGE UP (ref 22–31)
CO2 SERPL-SCNC: 23 MMOL/L — SIGNIFICANT CHANGE UP (ref 22–31)
CO2 SERPL-SCNC: 23 MMOL/L — SIGNIFICANT CHANGE UP (ref 22–31)
CO2 SERPL-SCNC: 26 MMOL/L — SIGNIFICANT CHANGE UP (ref 22–31)
CREAT SERPL-MCNC: 1.02 MG/DL — SIGNIFICANT CHANGE UP (ref 0.5–1.3)
CREAT SERPL-MCNC: 1.04 MG/DL — SIGNIFICANT CHANGE UP (ref 0.5–1.3)
CREAT SERPL-MCNC: 1.19 MG/DL — SIGNIFICANT CHANGE UP (ref 0.5–1.3)
CREAT SERPL-MCNC: 1.3 MG/DL — SIGNIFICANT CHANGE UP (ref 0.5–1.3)
CULTURE RESULTS: ABNORMAL
EGFR: 102 ML/MIN/1.73M2 — SIGNIFICANT CHANGE UP
EGFR: 105 ML/MIN/1.73M2 — SIGNIFICANT CHANGE UP
EGFR: 78 ML/MIN/1.73M2 — SIGNIFICANT CHANGE UP
EGFR: 87 ML/MIN/1.73M2 — SIGNIFICANT CHANGE UP
GAS PNL BLDV: SIGNIFICANT CHANGE UP
GLUCOSE SERPL-MCNC: 127 MG/DL — HIGH (ref 70–99)
GLUCOSE SERPL-MCNC: 85 MG/DL — SIGNIFICANT CHANGE UP (ref 70–99)
GLUCOSE SERPL-MCNC: 89 MG/DL — SIGNIFICANT CHANGE UP (ref 70–99)
GLUCOSE SERPL-MCNC: 97 MG/DL — SIGNIFICANT CHANGE UP (ref 70–99)
HCO3 BLDV-SCNC: 26 MMOL/L — SIGNIFICANT CHANGE UP (ref 22–29)
HCO3 BLDV-SCNC: 30 MMOL/L — HIGH (ref 22–29)
HCT VFR BLD CALC: 42.7 % — SIGNIFICANT CHANGE UP (ref 39–50)
HCT VFR BLD CALC: 44.3 % — SIGNIFICANT CHANGE UP (ref 39–50)
HGB BLD-MCNC: 14.4 G/DL — SIGNIFICANT CHANGE UP (ref 13–17)
HGB BLD-MCNC: 15.4 G/DL — SIGNIFICANT CHANGE UP (ref 13–17)
MAGNESIUM SERPL-MCNC: 2 MG/DL — SIGNIFICANT CHANGE UP (ref 1.6–2.6)
MAGNESIUM SERPL-MCNC: 2 MG/DL — SIGNIFICANT CHANGE UP (ref 1.6–2.6)
MAGNESIUM SERPL-MCNC: 2.1 MG/DL — SIGNIFICANT CHANGE UP (ref 1.6–2.6)
MAGNESIUM SERPL-MCNC: 2.3 MG/DL — SIGNIFICANT CHANGE UP (ref 1.6–2.6)
MCHC RBC-ENTMCNC: 28.1 PG — SIGNIFICANT CHANGE UP (ref 27–34)
MCHC RBC-ENTMCNC: 29.2 PG — SIGNIFICANT CHANGE UP (ref 27–34)
MCHC RBC-ENTMCNC: 33.7 GM/DL — SIGNIFICANT CHANGE UP (ref 32–36)
MCHC RBC-ENTMCNC: 34.8 GM/DL — SIGNIFICANT CHANGE UP (ref 32–36)
MCV RBC AUTO: 83.4 FL — SIGNIFICANT CHANGE UP (ref 80–100)
MCV RBC AUTO: 83.9 FL — SIGNIFICANT CHANGE UP (ref 80–100)
NRBC # BLD: 0 /100 WBCS — SIGNIFICANT CHANGE UP (ref 0–0)
NRBC # BLD: 0 /100 WBCS — SIGNIFICANT CHANGE UP (ref 0–0)
NRBC # FLD: 0 K/UL — SIGNIFICANT CHANGE UP (ref 0–0)
NRBC # FLD: 0 K/UL — SIGNIFICANT CHANGE UP (ref 0–0)
PCO2 BLDV: 45 MMHG — SIGNIFICANT CHANGE UP (ref 42–55)
PCO2 BLDV: 53 MMHG — SIGNIFICANT CHANGE UP (ref 42–55)
PH BLDV: 7.36 — SIGNIFICANT CHANGE UP (ref 7.32–7.43)
PH BLDV: 7.37 — SIGNIFICANT CHANGE UP (ref 7.32–7.43)
PHOSPHATE SERPL-MCNC: 2.2 MG/DL — LOW (ref 2.5–4.5)
PHOSPHATE SERPL-MCNC: 2.6 MG/DL — SIGNIFICANT CHANGE UP (ref 2.5–4.5)
PHOSPHATE SERPL-MCNC: 2.9 MG/DL — SIGNIFICANT CHANGE UP (ref 2.5–4.5)
PHOSPHATE SERPL-MCNC: 3.8 MG/DL — SIGNIFICANT CHANGE UP (ref 2.5–4.5)
PLATELET # BLD AUTO: 73 K/UL — LOW (ref 150–400)
PLATELET # BLD AUTO: 76 K/UL — LOW (ref 150–400)
PO2 BLDV: 35 MMHG — SIGNIFICANT CHANGE UP (ref 25–45)
PO2 BLDV: 71 MMHG — HIGH (ref 25–45)
POTASSIUM SERPL-MCNC: 3.5 MMOL/L — SIGNIFICANT CHANGE UP (ref 3.5–5.3)
POTASSIUM SERPL-MCNC: 3.7 MMOL/L — SIGNIFICANT CHANGE UP (ref 3.5–5.3)
POTASSIUM SERPL-MCNC: 3.8 MMOL/L — SIGNIFICANT CHANGE UP (ref 3.5–5.3)
POTASSIUM SERPL-MCNC: 3.9 MMOL/L — SIGNIFICANT CHANGE UP (ref 3.5–5.3)
POTASSIUM SERPL-SCNC: 3.5 MMOL/L — SIGNIFICANT CHANGE UP (ref 3.5–5.3)
POTASSIUM SERPL-SCNC: 3.7 MMOL/L — SIGNIFICANT CHANGE UP (ref 3.5–5.3)
POTASSIUM SERPL-SCNC: 3.8 MMOL/L — SIGNIFICANT CHANGE UP (ref 3.5–5.3)
POTASSIUM SERPL-SCNC: 3.9 MMOL/L — SIGNIFICANT CHANGE UP (ref 3.5–5.3)
PROT SERPL-MCNC: 6.1 G/DL — SIGNIFICANT CHANGE UP (ref 6–8.3)
PROT SERPL-MCNC: 6.4 G/DL — SIGNIFICANT CHANGE UP (ref 6–8.3)
PROT SERPL-MCNC: 6.7 G/DL — SIGNIFICANT CHANGE UP (ref 6–8.3)
PROT SERPL-MCNC: 6.9 G/DL — SIGNIFICANT CHANGE UP (ref 6–8.3)
RBC # BLD: 5.12 M/UL — SIGNIFICANT CHANGE UP (ref 4.2–5.8)
RBC # BLD: 5.28 M/UL — SIGNIFICANT CHANGE UP (ref 4.2–5.8)
RBC # FLD: 13.5 % — SIGNIFICANT CHANGE UP (ref 10.3–14.5)
RBC # FLD: 14 % — SIGNIFICANT CHANGE UP (ref 10.3–14.5)
SAO2 % BLDV: 61.2 % — LOW (ref 67–88)
SAO2 % BLDV: 95.3 % — HIGH (ref 67–88)
SODIUM SERPL-SCNC: 134 MMOL/L — LOW (ref 135–145)
SODIUM SERPL-SCNC: 134 MMOL/L — LOW (ref 135–145)
SODIUM SERPL-SCNC: 135 MMOL/L — SIGNIFICANT CHANGE UP (ref 135–145)
SODIUM SERPL-SCNC: 137 MMOL/L — SIGNIFICANT CHANGE UP (ref 135–145)
SPECIMEN SOURCE: SIGNIFICANT CHANGE UP
WBC # BLD: 3.29 K/UL — LOW (ref 3.8–10.5)
WBC # BLD: 3.61 K/UL — LOW (ref 3.8–10.5)
WBC # FLD AUTO: 3.29 K/UL — LOW (ref 3.8–10.5)
WBC # FLD AUTO: 3.61 K/UL — LOW (ref 3.8–10.5)

## 2024-10-13 PROCEDURE — 99232 SBSQ HOSP IP/OBS MODERATE 35: CPT

## 2024-10-13 RX ORDER — ARTEMETHER AND LUMEFANTRINE 20; 120 MG/1; MG/1
4 TABLET ORAL
Refills: 0 | Status: DISCONTINUED | OUTPATIENT
Start: 2024-10-13 | End: 2024-10-14

## 2024-10-13 RX ORDER — SOD PHOS DI, MONO/K PHOS MONO 250 MG
2 TABLET ORAL ONCE
Refills: 0 | Status: COMPLETED | OUTPATIENT
Start: 2024-10-13 | End: 2024-10-13

## 2024-10-13 RX ADMIN — Medication 2 PACKET(S): at 13:15

## 2024-10-13 RX ADMIN — ARTEMETHER AND LUMEFANTRINE 4 TABLET(S): 20; 120 TABLET ORAL at 17:54

## 2024-10-13 NOTE — PROGRESS NOTE ADULT - PROBLEM SELECTOR PLAN 1
Patient meets severe criteria (must meet at least 2 criteria):  - Tbili > 3mg/dL (7.3 on admission) PLUS parasitemia > 2.5% (8% on BCx)  - Parasitemia > 5% (8% on BCx)  - BUN > 20 (20 on admission)      Plan:  - IV Artesunate 220mg q12  for total 3 doses  -- First dose 10/11 at 2PM  -- Second dose 10/12 at 2AM  -- Third dose 10/12 at 2 PM  -- 3 dose series completed -> f/u parasite count    - q6 CBC, CMP to trend Hgb, Plt, serum glucose, T/Dbili  - Repeat parasite count 24hr from first dose (2PM on 10/12) -> see ID consult note  - ID recs appreciated  - s/p zosyn x1 in ED Patient meets severe criteria (must meet at least 2 criteria):  - Tbili > 3mg/dL (7.3 on admission) PLUS parasitemia > 2.5% (8% on BCx)  - Parasitemia > 5% (8% on BCx)  - BUN > 20 (20 on admission)      Plan:  - IV Artesunate 220mg q12  for total 3 doses  -- First dose 10/11 at 2PM  -- Second dose 10/12 at 2AM  -- Third dose 10/12 at 2 PM  -- 3 dose series completed -> f/u parasite count, specimen received     - q6 CBC, CMP to trend Hgb, Plt, serum glucose, T/Dbili  - Repeat parasite count 24hr from first dose (2PM on 10/12) -> see ID consult note  - ID recs appreciated  - s/p zosyn x1 in ED

## 2024-10-13 NOTE — PROGRESS NOTE ADULT - ATTENDING COMMENTS
24M with no significant PMHx presenting to the emergency department with 5 days of recurrent fevers following travel to [place] two weeks ago now with BCx positive for Plasmodium falciparum admitted for treatment of severe malaria.    #severe malaria  #severe sepsis  #thrombocytopenia  #splenomegaly  - recent trip to [place]  - ID recs appreciated  - s/p IV artesunate x3 dose. repeat parasitemia level <1%. transitioned to Coartem   - monitor CMP  - transfuse prn    #NADEGE  - improved with fluids c/w pre-renal NADEGE in the setting of sepsis
24M with no significant PMHx presenting to the emergency department with 5 days of recurrent fevers following travel to [place] two weeks ago now with BCx positive for Plasmodium falciparum admitted for treatment of severe malaria.    #severe malaria  #severe sepsis  #thrombocytopenia  #splenomegaly  - recent trip to [place]  - ID recs appreciated  - s/p IV artesunate x3 dose. f/u repeat parasitemia level   - monitor CMP  - transfuse prn    #elevated Cr  - stable  - may be pre-renal NADEGE in the setting of sepsis vs falsely low GFR in the setting of muscle mass

## 2024-10-13 NOTE — PROGRESS NOTE ADULT - SUBJECTIVE AND OBJECTIVE BOX
Progress Note    10-11-24 (2d)    Patient is a 24y old  Male who presents with a chief complaint of Fevers (12 Oct 2024 16:50)      Subjective / Overnight Events :  - No acute events overnight.  - Pt seen and examined at bedside.     Additional ROS (if any):    MEDICATIONS  (STANDING):  influenza   Vaccine 0.5 milliLiter(s) IntraMuscular once  lactated ringers. 1000 milliLiter(s) (75 mL/Hr) IV Continuous <Continuous>    MEDICATIONS  (PRN):  acetaminophen     Tablet .. 650 milliGRAM(s) Oral every 6 hours PRN Temp greater or equal to 38C (100.4F), Mild Pain (1 - 3)  melatonin 3 milliGRAM(s) Oral at bedtime PRN Insomnia          PHYSICAL EXAM:  Vital Signs Last 24 Hrs  T(C): 36.7 (13 Oct 2024 01:15), Max: 37.1 (12 Oct 2024 18:00)  T(F): 98.1 (13 Oct 2024 01:15), Max: 98.8 (12 Oct 2024 18:00)  HR: 100 (13 Oct 2024 01:15) (100 - 113)  BP: 105/60 (13 Oct 2024 01:15) (104/60 - 111/76)  BP(mean): --  RR: 18 (13 Oct 2024 01:15) (18 - 19)  SpO2: 98% (13 Oct 2024 01:15) (98% - 100%)    Parameters below as of 13 Oct 2024 01:15  Patient On (Oxygen Delivery Method): room air        I&O's Summary      General: NAD, non-toxic appearing   HEENT: PERRLA, EOMi, no scleral icterus  CV: RRR, normal S1 and S2, no m/r/g  Lungs: normal respiratory effort. CTAB, no wheezes, rales, or rhonchi  Abd: soft, nontender, nondistended  Ext: no edema, 2+ peripheral pulses   Pysch: AAOx3, appropriate affect   Neuro: grossly non-focal, moving all extremities spontaneously   Skin: no rashes or lesions     LABS:  CAPILLARY BLOOD GLUCOSE                                  14.4   5.08  )-----------( 41       ( 12 Oct 2024 15:39 )             40.7       WBC Trend: 5.08<--, 6.24<--, 6.39<--  Hb Trend: 14.4<--, 14.6<--, 14.6<--, 15.1<--, 13.7<--    10-13    135  |  100  |  10  ----------------------------<  89  3.8   |  23  |  1.30    Ca    8.2[L]      13 Oct 2024 03:31  Phos  2.6     10-13  Mg     2.00     10-13    TPro  6.1  /  Alb  3.0[L]  /  TBili  1.4[H]  /  DBili  x   /  AST  69[H]  /  ALT  32  /  AlkPhos  61  10-13    PT/INR - ( 12 Oct 2024 06:16 )   PT: 13.8 sec;   INR: 1.19 ratio         PTT - ( 12 Oct 2024 06:16 )  PTT:36.9 sec      Urinalysis Basic - ( 13 Oct 2024 03:31 )    Color: x / Appearance: x / SG: x / pH: x  Gluc: 89 mg/dL / Ketone: x  / Bili: x / Urobili: x   Blood: x / Protein: x / Nitrite: x   Leuk Esterase: x / RBC: x / WBC x   Sq Epi: x / Non Sq Epi: x / Bacteria: x        Culture - Urine (collected 11 Oct 2024 00:05)  Source: Clean Catch  Final Report (12 Oct 2024 07:33):    No growth    Urinalysis with Rflx Culture (collected 11 Oct 2024 00:05)    Culture - Blood (collected 10 Oct 2024 21:13)  Source: .Blood BLOOD  Preliminary Report (12 Oct 2024 07:01):    No growth at 24 hours    Culture - Blood (collected 10 Oct 2024 19:34)  Source: .Blood BLOOD  Preliminary Report (13 Oct 2024 01:02):    No growth at 48 Hours          RADIOLOGY & ADDITIONAL TESTS: Reviewed Progress Note    10-11-24 (2d)    Patient is a 24y old  Male who presents with a chief complaint of Fevers (12 Oct 2024 16:50)      Subjective / Overnight Events :  No complaints.     Additional ROS (if any):    MEDICATIONS  (STANDING):  influenza   Vaccine 0.5 milliLiter(s) IntraMuscular once  lactated ringers. 1000 milliLiter(s) (75 mL/Hr) IV Continuous <Continuous>    MEDICATIONS  (PRN):  acetaminophen     Tablet .. 650 milliGRAM(s) Oral every 6 hours PRN Temp greater or equal to 38C (100.4F), Mild Pain (1 - 3)  melatonin 3 milliGRAM(s) Oral at bedtime PRN Insomnia          PHYSICAL EXAM:  Vital Signs Last 24 Hrs  T(C): 36.7 (13 Oct 2024 01:15), Max: 37.1 (12 Oct 2024 18:00)  T(F): 98.1 (13 Oct 2024 01:15), Max: 98.8 (12 Oct 2024 18:00)  HR: 100 (13 Oct 2024 01:15) (100 - 113)  BP: 105/60 (13 Oct 2024 01:15) (104/60 - 111/76)  BP(mean): --  RR: 18 (13 Oct 2024 01:15) (18 - 19)  SpO2: 98% (13 Oct 2024 01:15) (98% - 100%)    Parameters below as of 13 Oct 2024 01:15  Patient On (Oxygen Delivery Method): room air        I&O's Summary      General: NAD, non-toxic appearing   HEENT: PERRLA, EOMi, no scleral icterus  CV: RRR, normal S1 and S2, no m/r/g  Lungs: normal respiratory effort. CTAB, no wheezes, rales, or rhonchi  Abd: soft, nontender, nondistended  Ext: no edema, 2+ peripheral pulses   Pysch: AAOx3, appropriate affect   Neuro: grossly non-focal, moving all extremities spontaneously   Skin: no rashes or lesions     LABS:  CAPILLARY BLOOD GLUCOSE                                  14.4   5.08  )-----------( 41       ( 12 Oct 2024 15:39 )             40.7       WBC Trend: 5.08<--, 6.24<--, 6.39<--  Hb Trend: 14.4<--, 14.6<--, 14.6<--, 15.1<--, 13.7<--    10-13    135  |  100  |  10  ----------------------------<  89  3.8   |  23  |  1.30    Ca    8.2[L]      13 Oct 2024 03:31  Phos  2.6     10-13  Mg     2.00     10-13    TPro  6.1  /  Alb  3.0[L]  /  TBili  1.4[H]  /  DBili  x   /  AST  69[H]  /  ALT  32  /  AlkPhos  61  10-13    PT/INR - ( 12 Oct 2024 06:16 )   PT: 13.8 sec;   INR: 1.19 ratio         PTT - ( 12 Oct 2024 06:16 )  PTT:36.9 sec      Urinalysis Basic - ( 13 Oct 2024 03:31 )    Color: x / Appearance: x / SG: x / pH: x  Gluc: 89 mg/dL / Ketone: x  / Bili: x / Urobili: x   Blood: x / Protein: x / Nitrite: x   Leuk Esterase: x / RBC: x / WBC x   Sq Epi: x / Non Sq Epi: x / Bacteria: x        Culture - Urine (collected 11 Oct 2024 00:05)  Source: Clean Catch  Final Report (12 Oct 2024 07:33):    No growth    Urinalysis with Rflx Culture (collected 11 Oct 2024 00:05)    Culture - Blood (collected 10 Oct 2024 21:13)  Source: .Blood BLOOD  Preliminary Report (12 Oct 2024 07:01):    No growth at 24 hours    Culture - Blood (collected 10 Oct 2024 19:34)  Source: .Blood BLOOD  Preliminary Report (13 Oct 2024 01:02):    No growth at 48 Hours          RADIOLOGY & ADDITIONAL TESTS: Reviewed

## 2024-10-13 NOTE — PROGRESS NOTE ADULT - TIME BILLING
Time-based billing (NON-critical care).     More than 50% of the visit was spent counseling and / or coordinating care by the attending physician.      The necessity of the time spent during the encounter on this date of service was due to: documentation in [place], reviewing chart and coordinating care with patient/residents and interdisciplinary staff (such as , social workers, etc) as well as reviewing vitals, laboratory data, radiology, medication list, consultants' recommendations and prior records. Interventions were performed as documented above.
Time-based billing (NON-critical care).     More than 50% of the visit was spent counseling and / or coordinating care by the attending physician.      The necessity of the time spent during the encounter on this date of service was due to: documentation in [place], reviewing chart and coordinating care with patient/residents and interdisciplinary staff (such as , social workers, etc) as well as reviewing vitals, laboratory data, radiology, medication list, consultants' recommendations and prior records. Interventions were performed as documented above.

## 2024-10-14 VITALS
HEART RATE: 86 BPM | DIASTOLIC BLOOD PRESSURE: 70 MMHG | SYSTOLIC BLOOD PRESSURE: 128 MMHG | RESPIRATION RATE: 16 BRPM | TEMPERATURE: 98 F | OXYGEN SATURATION: 100 %

## 2024-10-14 LAB
ADD ON TEST-SPECIMEN IN LAB: SIGNIFICANT CHANGE UP
ALBUMIN SERPL ELPH-MCNC: 3.2 G/DL — LOW (ref 3.3–5)
ALP SERPL-CCNC: 63 U/L — SIGNIFICANT CHANGE UP (ref 40–120)
ALT FLD-CCNC: 45 U/L — HIGH (ref 4–41)
ANION GAP SERPL CALC-SCNC: 10 MMOL/L — SIGNIFICANT CHANGE UP (ref 7–14)
ANISOCYTOSIS BLD QL: SLIGHT — SIGNIFICANT CHANGE UP
AST SERPL-CCNC: 57 U/L — HIGH (ref 4–40)
BASE EXCESS BLDV CALC-SCNC: 1.5 MMOL/L — SIGNIFICANT CHANGE UP (ref -2–3)
BASOPHILS # BLD AUTO: 0 K/UL — SIGNIFICANT CHANGE UP (ref 0–0.2)
BASOPHILS NFR BLD AUTO: 0 % — SIGNIFICANT CHANGE UP (ref 0–2)
BILIRUB SERPL-MCNC: 1.1 MG/DL — SIGNIFICANT CHANGE UP (ref 0.2–1.2)
BUN SERPL-MCNC: 11 MG/DL — SIGNIFICANT CHANGE UP (ref 7–23)
CALCIUM SERPL-MCNC: 8.3 MG/DL — LOW (ref 8.4–10.5)
CHLORIDE SERPL-SCNC: 104 MMOL/L — SIGNIFICANT CHANGE UP (ref 98–107)
CO2 BLDV-SCNC: 28 MMOL/L — HIGH (ref 22–26)
CO2 SERPL-SCNC: 23 MMOL/L — SIGNIFICANT CHANGE UP (ref 22–31)
CREAT SERPL-MCNC: 1.1 MG/DL — SIGNIFICANT CHANGE UP (ref 0.5–1.3)
EGFR: 96 ML/MIN/1.73M2 — SIGNIFICANT CHANGE UP
EOSINOPHIL # BLD AUTO: 0.03 K/UL — SIGNIFICANT CHANGE UP (ref 0–0.5)
EOSINOPHIL NFR BLD AUTO: 0.9 % — SIGNIFICANT CHANGE UP (ref 0–6)
GIANT PLATELETS BLD QL SMEAR: PRESENT — SIGNIFICANT CHANGE UP
GLUCOSE SERPL-MCNC: 105 MG/DL — HIGH (ref 70–99)
HCO3 BLDV-SCNC: 27 MMOL/L — SIGNIFICANT CHANGE UP (ref 22–29)
HCT VFR BLD CALC: 41.8 % — SIGNIFICANT CHANGE UP (ref 39–50)
HGB BLD-MCNC: 14.2 G/DL — SIGNIFICANT CHANGE UP (ref 13–17)
IANC: 0.88 K/UL — LOW (ref 1.8–7.4)
LYMPHOCYTES # BLD AUTO: 0.79 K/UL — LOW (ref 1–3.3)
LYMPHOCYTES # BLD AUTO: 23.9 % — SIGNIFICANT CHANGE UP (ref 13–44)
MAGNESIUM SERPL-MCNC: 2.1 MG/DL — SIGNIFICANT CHANGE UP (ref 1.6–2.6)
MCHC RBC-ENTMCNC: 28.5 PG — SIGNIFICANT CHANGE UP (ref 27–34)
MCHC RBC-ENTMCNC: 34 GM/DL — SIGNIFICANT CHANGE UP (ref 32–36)
MCV RBC AUTO: 83.8 FL — SIGNIFICANT CHANGE UP (ref 80–100)
METAMYELOCYTES # FLD: 1.8 % — HIGH (ref 0–1)
MICROCYTES BLD QL: SLIGHT — SIGNIFICANT CHANGE UP
MONOCYTES # BLD AUTO: 1.23 K/UL — HIGH (ref 0–0.9)
MONOCYTES NFR BLD AUTO: 37.2 % — HIGH (ref 2–14)
NEUTROPHILS # BLD AUTO: 0.96 K/UL — LOW (ref 1.8–7.4)
NEUTROPHILS NFR BLD AUTO: 26.5 % — LOW (ref 43–77)
NEUTS BAND # BLD: 2.6 % — SIGNIFICANT CHANGE UP (ref 0–6)
NRBC # BLD: 0 /100 WBCS — SIGNIFICANT CHANGE UP (ref 0–0)
NRBC # BLD: 1 /100 WBCS — HIGH (ref 0–0)
NRBC # FLD: 0 K/UL — SIGNIFICANT CHANGE UP (ref 0–0)
PCO2 BLDV: 43 MMHG — SIGNIFICANT CHANGE UP (ref 42–55)
PH BLDV: 7.4 — SIGNIFICANT CHANGE UP (ref 7.32–7.43)
PHOSPHATE SERPL-MCNC: 3.8 MG/DL — SIGNIFICANT CHANGE UP (ref 2.5–4.5)
PLAT MORPH BLD: ABNORMAL
PLATELET # BLD AUTO: 82 K/UL — LOW (ref 150–400)
PLATELET COUNT - ESTIMATE: ABNORMAL
PO2 BLDV: 75 MMHG — HIGH (ref 25–45)
POTASSIUM SERPL-MCNC: 3.8 MMOL/L — SIGNIFICANT CHANGE UP (ref 3.5–5.3)
POTASSIUM SERPL-SCNC: 3.8 MMOL/L — SIGNIFICANT CHANGE UP (ref 3.5–5.3)
PROT SERPL-MCNC: 6.5 G/DL — SIGNIFICANT CHANGE UP (ref 6–8.3)
RBC # BLD: 4.99 M/UL — SIGNIFICANT CHANGE UP (ref 4.2–5.8)
RBC # FLD: 14.1 % — SIGNIFICANT CHANGE UP (ref 10.3–14.5)
RBC BLD AUTO: ABNORMAL
SAO2 % BLDV: 96.1 % — HIGH (ref 67–88)
SMUDGE CELLS # BLD: PRESENT — SIGNIFICANT CHANGE UP
SODIUM SERPL-SCNC: 137 MMOL/L — SIGNIFICANT CHANGE UP (ref 135–145)
VARIANT LYMPHS # BLD: 7.1 % — HIGH (ref 0–6)
WBC # BLD: 3.3 K/UL — LOW (ref 3.8–10.5)
WBC # FLD AUTO: 3.3 K/UL — LOW (ref 3.8–10.5)

## 2024-10-14 PROCEDURE — 99239 HOSP IP/OBS DSCHRG MGMT >30: CPT

## 2024-10-14 PROCEDURE — 99232 SBSQ HOSP IP/OBS MODERATE 35: CPT

## 2024-10-14 RX ORDER — ARTEMETHER AND LUMEFANTRINE 20; 120 MG/1; MG/1
4 TABLET ORAL
Qty: 0 | Refills: 0 | DISCHARGE
Start: 2024-10-14

## 2024-10-14 RX ORDER — ACETAMINOPHEN 325 MG
2 TABLET ORAL
Qty: 0 | Refills: 0 | DISCHARGE
Start: 2024-10-14

## 2024-10-14 RX ORDER — ARTEMETHER AND LUMEFANTRINE 20; 120 MG/1; MG/1
4 TABLET ORAL
Qty: 20 | Refills: 0
Start: 2024-10-14 | End: 2024-10-15

## 2024-10-14 RX ORDER — ARTEMETHER AND LUMEFANTRINE 20; 120 MG/1; MG/1
4 TABLET ORAL
Qty: 16 | Refills: 0
Start: 2024-10-14 | End: 2024-10-15

## 2024-10-14 RX ADMIN — ARTEMETHER AND LUMEFANTRINE 4 TABLET(S): 20; 120 TABLET ORAL at 06:10

## 2024-10-14 RX ADMIN — ARTEMETHER AND LUMEFANTRINE 4 TABLET(S): 20; 120 TABLET ORAL at 18:55

## 2024-10-14 NOTE — PROGRESS NOTE ADULT - SUBJECTIVE AND OBJECTIVE BOX
Follow Up:  malaria    Interval History/ROS:  Overnight: No acute events.  Patient remains afebrile.  Otherwise hemodynamically stable.  Latest labs show no leukocytosis.  Thrombocytopenia 82.  BMP with renal function within normal limits, AST 57, ALT 45.  Latest parasitemia level on 10/13/2024 is less than 1%.     Patient seen examined at bedside.  Denies any new pain or discomfort.     Allergies  No Known Allergies        ANTIMICROBIALS:  artemether 20 mG/lumefantrine 120 mG Tablet 4 two times a day      OTHER MEDS:  MEDICATIONS  (STANDING):  acetaminophen     Tablet .. 650 every 6 hours PRN  influenza   Vaccine 0.5 once  melatonin 3 at bedtime PRN      Vital Signs Last 24 Hrs  T(C): 36.5 (14 Oct 2024 13:17), Max: 37.4 (13 Oct 2024 17:16)  T(F): 97.7 (14 Oct 2024 13:17), Max: 99.4 (13 Oct 2024 17:16)  HR: 91 (14 Oct 2024 13:17) (80 - 101)  BP: 113/73 (14 Oct 2024 13:17) (90/67 - 120/69)  BP(mean): --  RR: 17 (14 Oct 2024 13:17) (16 - 18)  SpO2: 100% (14 Oct 2024 13:17) (97% - 100%)    Parameters below as of 14 Oct 2024 13:17  Patient On (Oxygen Delivery Method): room air        PHYSICAL EXAM:  General: Patient in no acute distress  Eyes: +icterus  ENT: No oral ulcers   CV: S1+S2, normal   Lungs: No respiratory distress, CTAB  Abd: Soft, nontender  Ext: No edema  Neuro: Alert and oriented  Skin: No rash                              14.2   3.30  )-----------( 82       ( 14 Oct 2024 04:00 )             41.8       10-14    137  |  104  |  11  ----------------------------<  105[H]  3.8   |  23  |  1.10    Ca    8.3[L]      14 Oct 2024 04:00  Phos  3.8     10-14  Mg     2.10     10-14    TPro  6.5  /  Alb  3.2[L]  /  TBili  1.1  /  DBili  x   /  AST  57[H]  /  ALT  45[H]  /  AlkPhos  63  10-14      Urinalysis Basic - ( 14 Oct 2024 04:00 )    Color: x / Appearance: x / SG: x / pH: x  Gluc: 105 mg/dL / Ketone: x  / Bili: x / Urobili: x   Blood: x / Protein: x / Nitrite: x   Leuk Esterase: x / RBC: x / WBC x   Sq Epi: x / Non Sq Epi: x / Bacteria: x        MICROBIOLOGY:  v    Parasitemia, Blood (collected 13 Oct 2024 03:31)  Source: .Blood BLOOD  Final Report (13 Oct 2024 11:03):    Positive for Plasmodium falciparum by Giemsa Stain    Parasitemia = Less than 1%    Culture - Urine (collected 11 Oct 2024 00:05)  Source: Clean Catch  Final Report (12 Oct 2024 07:33):    No growth    Urinalysis with Rflx Culture (collected 11 Oct 2024 00:05)    Culture - Blood (collected 10 Oct 2024 21:13)  Source: .Blood BLOOD  Preliminary Report (14 Oct 2024 07:00):    No growth at 72 Hours    Culture - Blood (collected 10 Oct 2024 19:34)  Source: .Blood BLOOD  Preliminary Report (14 Oct 2024 01:01):    No growth at 72 Hours                    RADIOLOGY:  Imaging reviewed

## 2024-10-14 NOTE — DISCHARGE NOTE PROVIDER - HOSPITAL COURSE
Discharge Summary     Admission diagnoses:   ***    Discharge diagnoses:   ***    Hospital Course:   For full details, please see H&P, progress notes, consult notes, and ancillary notes. Briefly, Mr Kidd is a 24y year old Male with a history of recent travel to [facility]. The patient's hospital course will be summarized in a problem based format.    # Falciparum Malaria      # ***    On day of discharge, patient is clinically stable with no new exam findings or acute symptoms compared to prior. The patient was seen by the attending physician on the date of discharge and deemed stable and acceptable for discharge. The patient's chronic medical conditions were treated accordingly per the patient's home medication regimen. The patient's medication reconciliation, follow up appointments, discharge instructions, and significant lab and diagnostic studies are as noted.     Discharge follow up action items:     1. Follow up with PCP  2. Follow up labs, path, & imaging ***  3. Medication changes ***    Patient's ordered code status: ***    Patient disposition: ***     Discharge Summary     Admission diagnoses:   Falciparum Malaria    Discharge diagnoses:   Falciparum Malaria    Hospital Course:   For full details, please see H&P, progress notes, consult notes, and ancillary notes. Briefly, Mr Kidd is a 24y year old Male with a history of recent travel to [facility]. The patient's hospital course will be summarized in a problem based format.    # Falciparum Malaria  Patient was diagnosed with severe malaria (Tbili > 3mg/dL (7.3 on admission) PLUS parasitemia > 2.5% (8% on BCx), Parasitemia > 5% (8% on BCx), BUN > 20 (20 on admission)). He received a single dose of zosyn in the emergency department and was started on IV Artesunate 22mg q12 for three total doses. After his third dose, parasitemia level was <1% from 8% at admission. He was then switched to coartem 80/480 BID, which he received in hospital and continued upon discharge.    # DIC/Thrombocytopenia  Likely secondary to hemolysis in the setting of Plasmodium falciparum infection. On admission, the patient was found to have a platelet count of 21 and PT/PTT/INR of 17.7/36.4/1.49. Over the course of his stay in the hospital, we followed the trend in these laboratory values via CBC, CMP, and VBG. Upon discharge, the values improved to acceptable levels for discharge. Platelet level at discharge was 82.    # NADEGE  Likely secondary to infection and hyperbilirubinemia. We trended these lab values over the course of this admission until resolution.    #Splenomegaly  Visualized on CT Abdomen performed at admission. Likely secondary to ongoing malaria infection. Patient must avoid contact sports for at least 8 weeks due to risk for splenic rupture.        On day of discharge, patient is clinically stable with no new exam findings or acute symptoms compared to prior. The patient was seen by the attending physician on the date of discharge and deemed stable and acceptable for discharge. The patient's chronic medical conditions were treated accordingly per the patient's home medication regimen. The patient's medication reconciliation, follow up appointments, discharge instructions, and significant lab and diagnostic studies are as noted.     Discharge follow up action items:     1. Follow up with PCP, Infectious Disease clinic  2. You will need follow up labs including parasitemia level  3. You will need to complete a three day course of coartem 80/480 BID    Patient's ordered code status: FULL CODE    Patient disposition: HOME     Discharge Summary     Admission diagnoses:   Falciparum Malaria    Discharge diagnoses:   Falciparum Malaria    Hospital Course:   For full details, please see H&P, progress notes, consult notes, and ancillary notes. Briefly, Mr Kidd is a 24y year old Male with a history of recent travel to [facility]. The patient's hospital course will be summarized in a problem based format.    # Falciparum Malaria  Patient was diagnosed with severe malaria (Tbili > 3mg/dL (7.3 on admission) PLUS parasitemia > 2.5% (8% on BCx), Parasitemia > 5% (8% on BCx), BUN > 20 (20 on admission)). He received a single dose of zosyn in the emergency department and was started on IV Artesunate 22mg q12 for three total doses. After his third dose, parasitemia level was <1% from 8% at admission. He was then switched to coartem 80/480 BID, which he received in hospital and continued upon discharge.    # DIC/Thrombocytopenia  Likely secondary to hemolysis in the setting of Plasmodium falciparum infection. On admission, the patient was found to have a platelet count of 21 and PT/PTT/INR of 17.7/36.4/1.49. Over the course of his stay in the hospital, we followed the trend in these laboratory values via CBC, CMP, and VBG. Upon discharge, the values improved to acceptable levels for discharge. Platelet level at discharge was 82.    # NADEGE  Likely secondary to infection and hyperbilirubinemia. We trended these lab values over the course of this admission until resolution.    #Splenomegaly  Visualized on CT Abdomen performed at admission. Likely secondary to ongoing malaria infection. Patient must avoid contact sports for at least 8 weeks due to risk for splenic rupture.    On day of discharge, patient is clinically stable with no new exam findings or acute symptoms compared to prior. The patient was seen by the attending physician on the date of discharge and deemed stable and acceptable for discharge. The patient's chronic medical conditions were treated accordingly per the patient's home medication regimen. The patient's medication reconciliation, follow up appointments, discharge instructions, and significant lab and diagnostic studies are as noted.     Discharge follow up action items:     1. Follow up with PCP, Infectious Disease clinic  2. You will need follow up labs including parasitemia level, CBC, CMP in 7, 14 and 30 days  3. You will need to complete a three day course of coartem 80/480 BID    Patient's ordered code status: FULL CODE    Patient disposition: HOME     Discharge Summary     Admission diagnoses:   Falciparum Malaria    Discharge diagnoses:   Falciparum Malaria    Hospital Course:   For full details, please see H&P, progress notes, consult notes, and ancillary notes. Briefly, Mr Kidd is a 24y year old Male with a history of recent travel to [facility]. The patient's hospital course will be summarized in a problem based format.    # Falciparum Malaria  Patient was diagnosed with severe malaria (Tbili > 3mg/dL (7.3 on admission) PLUS parasitemia > 2.5% (8% on BCx), Parasitemia > 5% (8% on BCx), BUN > 20 (20 on admission)). He received a single dose of zosyn in the emergency department and was started on IV Artesunate 22mg q12 for three total doses. After his third dose, parasitemia level was <1% from 8% at admission. He was then switched to coartem 80/480 BID, which he received in hospital and continued upon discharge. He will continue taking the coartem for 3 days total. The patient should obtain repeat parasitemia level and follow up at the infectious disease clinic within 1 week.    # DIC/Thrombocytopenia  Likely secondary to hemolysis in the setting of Plasmodium falciparum infection. On admission, the patient was found to have a platelet count of 21 and PT/PTT/INR of 17.7/36.4/1.49. Over the course of his stay in the hospital, we followed the trend in these laboratory values via CBC, CMP, and VBG. Upon discharge, the values improved to acceptable levels for discharge. Platelet level at discharge was 82.    # NADEGE  Likely secondary to infection and hyperbilirubinemia. We trended these lab values over the course of this admission until resolution.    #Splenomegaly  Visualized on CT Abdomen performed at admission. Likely secondary to ongoing malaria infection. Patient must avoid contact sports for at least 8 weeks due to risk for splenic rupture.    On day of discharge, patient is clinically stable with no new exam findings or acute symptoms compared to prior. The patient was seen by the attending physician on the date of discharge and deemed stable and acceptable for discharge. The patient's chronic medical conditions were treated accordingly per the patient's home medication regimen. The patient's medication reconciliation, follow up appointments, discharge instructions, and significant lab and diagnostic studies are as noted.     Discharge follow up action items:     1. Follow up with PCP, Infectious Disease clinic  2. You will need follow up labs including parasitemia level, CBC, CMP in 7, 14 and 30 days  3. You will need to complete a three day course of coartem 80/480 BID    Patient's ordered code status: FULL CODE    Patient disposition: HOME

## 2024-10-14 NOTE — PROGRESS NOTE ADULT - PROBLEM SELECTOR PLAN 4
Likely secondary to infection, hyperbilirubinemia. On admission, BUN 20, Cr 1.65    - trend BUN/Cr

## 2024-10-14 NOTE — PROGRESS NOTE ADULT - PROBLEM SELECTOR PLAN 1
Patient meets severe criteria (must meet at least 2 criteria):  - Tbili > 3mg/dL (7.3 on admission) PLUS parasitemia > 2.5% (8% on BCx)  - Parasitemia > 5% (8% on BCx)  - BUN > 20 (20 on admission)      Plan:  - IV Artesunate 220mg q12  for total 3 doses  -- First dose 10/11 at 2PM  -- Second dose 10/12 at 2AM  -- Third dose 10/12 at 2 PM  -- 3 dose series completed -> f/u parasite count, specimen received     - q6 CBC, CMP to trend Hgb, Plt, serum glucose, T/Dbili  - Repeat parasite count 24hr from first dose (2PM on 10/12) -> see ID consult note  - ID recs appreciated  - s/p zosyn x1 in ED Patient meets severe criteria (must meet at least 2 criteria):  - Tbili > 3mg/dL (7.3 on admission) PLUS parasitemia > 2.5% (8% on BCx)  - Parasitemia > 5% (8% on BCx)  - BUN > 20 (20 on admission)      Plan:  - IV Artesunate 220mg q12  for total 3 doses  -- First dose 10/11 at 2PM  -- Second dose 10/12 at 2AM  -- Third dose 10/12 at 2 PM  -- 3 dose series completed -> f/u parasite count -> <1% on 10/13 BCx  -- c/w coartem    - q6 CBC, CMP to trend Hgb, Plt, serum glucose, T/Dbili  - ID recs appreciated  - s/p zosyn x1 in ED

## 2024-10-14 NOTE — DISCHARGE NOTE PROVIDER - ATTENDING DISCHARGE PHYSICAL EXAMINATION:
VITAL SIGNS:  Vital Signs Last 24 Hrs  T(C): 36.5 (14 Oct 2024 09:19), Max: 37.8 (13 Oct 2024 13:16)  T(F): 97.7 (14 Oct 2024 09:19), Max: 100 (13 Oct 2024 13:16)  HR: 80 (14 Oct 2024 09:19) (80 - 101)  BP: 110/67 (14 Oct 2024 09:19) (90/67 - 120/69)  BP(mean): --  RR: 16 (14 Oct 2024 09:19) (16 - 18)  SpO2: 100% (14 Oct 2024 09:19) (97% - 100%)    Parameters below as of 14 Oct 2024 09:19  Patient On (Oxygen Delivery Method): room air        PHYSICAL EXAM:    General: NAD  HEENT: MMM  Neck: supple  Cardiovascular: +S1/S2; RRR  Respiratory: CTA B/L; no W/R/R  Gastrointestinal: soft, NT/ND  Extremities: WWP; no edema, clubbing or cyanosis  Neurological: awake and alert; communicating and able to follow commands without appreciated focal neurologic deficits

## 2024-10-14 NOTE — DISCHARGE NOTE PROVIDER - NSDCCPTREATMENT_GEN_ALL_CORE_FT
PRINCIPAL PROCEDURE  Procedure: Blood culture  Findings and Treatment: Blood Parasites   Specimen Source: BLOOD  Culture Results: Positive for Plasmodium falciparum by Giemsa Stain   Parasitemia = 8 %  Repeat Blood Parasites  Specimen source: BLOOD  Culture Results: Positive for Plasmodium falciparum by Giemsa Stain   Parasitemia = Less than 1%      SECONDARY PROCEDURE  Procedure: CT abdomen  Findings and Treatment: FINDINGS:  LOWER CHEST: Bilateral lower lobe subpleural linear/consolidative   opacities, likely atelectasis.  LIVER: Within normal limits.  BILE DUCTS: Normal caliber.  GALLBLADDER: Within normal limits.  SPLEEN: Splenomegaly, [id number] cm in the AP dimension.  PANCREAS: Within normal limits.  ADRENALS: Within normal limits.  KIDNEYS/URETERS: Bilaterally symmetric renal enhancement pattern. No   hydronephrosis. No nephrolithiasis.  BLADDER: Within normal limits.  REPRODUCTIVEORGANS: Prostate within normal limits.  BOWEL: No bowel obstruction. Appendix is normal.  PERITONEUM/RETROPERITONEUM: Within normal limits.  VESSELS: Within normal limits.  LYMPH NODES: No lymphadenopathy.  ABDOMINAL WALL: Small fat-containing umbilical hernia.  BONES: Within normal limits.

## 2024-10-14 NOTE — DISCHARGE NOTE PROVIDER - NSDCMRMEDTOKEN_GEN_ALL_CORE_FT
artemether-lumefantrine 20 mg-120 mg oral tablet: 4 tab(s) orally 2 times a day   acetaminophen 325 mg oral tablet: 2 tab(s) orally every 6 hours As needed Temp greater or equal to 38C (100.4F), Mild Pain (1 - 3)  artemether-lumefantrine 20 mg-120 mg oral tablet: 4 tab(s) orally 2 times a day   acetaminophen 325 mg oral tablet: 2 tab(s) orally every 6 hours As needed Temp greater or equal to 38C (100.4F), Mild Pain (1 - 3)  artemether-lumefantrine 20 mg-120 mg oral tablet: 4 tab(s) orally 2 times a day Take 4 tablets tonight, then 4 tablets twice daily for 2 days.  Blood Test: Complete Blood Count, Comprehensive Metabolic Panel, Parasite Level  Blood Test: Complete Blood Count, Comprehensive Metabolic Panel, Parasite Level  ICD-10: B50.9

## 2024-10-14 NOTE — DISCHARGE NOTE PROVIDER - NSDCCPCAREPLAN_GEN_ALL_CORE_FT
PRINCIPAL DISCHARGE DIAGNOSIS  Diagnosis: Falciparum malaria  Assessment and Plan of Treatment: You were diagnosed with Falciparum Malaria. This type of malaria is caused by an organism called Plasmodium falciparum, which is spread via mosquito bites. Given your recent travel to an area where this form of malaria is common, you were likely infected while traveling. We treated you with an intravenous medicatino called Artesunate while monitoring the level of parasite in your blood. When the amount of parasite in the blood had adequately decreased, we switched you to an oral medication that is continued after discharge.     PRINCIPAL DISCHARGE DIAGNOSIS  Diagnosis: Falciparum malaria  Assessment and Plan of Treatment: You were diagnosed with Falciparum Malaria. This type of malaria is caused by an organism called Plasmodium falciparum, which is spread via mosquito bites. Given your recent travel to an area where this form of malaria is common, you were likely infected while traveling. We treated you with an intravenous medicatino called Artesunate while monitoring the level of parasite in your blood. When the amount of parasite in the blood had adequately decreased, we switched you to an oral medication that is continued after discharge.  The oral medication should be taken for a total of three days, and you should repeat blood parasite level and follow up at the infectious disease clinic within 1 week.

## 2024-10-14 NOTE — DISCHARGE NOTE PROVIDER - CARE PROVIDER_API CALL
Disease, Infectious  [address] [facility], [place]ious [place], [address]  Phone: [phone]  Fax: (   )    -  Follow Up Time: 1 week

## 2024-10-14 NOTE — DISCHARGE NOTE PROVIDER - PROVIDER TOKENS
FREE:[LAST:[Disease],FIRST:[Infectious],PHONE:[phone]],FAX:[(   )    -],ADDRESS:[[address] [place] [address], Infectious [place]  [place], [address]],FOLLOWUP:[1 week]]

## 2024-10-14 NOTE — PROGRESS NOTE ADULT - PROBLEM SELECTOR PROBLEM 2
DIC (disseminated intravascular coagulation)

## 2024-10-14 NOTE — PROGRESS NOTE ADULT - PROBLEM SELECTOR PLAN 5
- Plt 21 on admission -> 19  - no AC at this time    - Full diet, no pork per patient

## 2024-10-14 NOTE — PROGRESS NOTE ADULT - ASSESSMENT
24 year old man no PMH presenting with 5 days of fevers, body aches, chills. Associated vomiting and dark-colored urine He recently returned from [place] 2 weeks ago, no malaria prophylaxis taken. ID consulted for malaria.    Patient was recently in [place] for 2 weeks and returned 2 weeks ago. While there he attended soccer games, ate out at restaurants, and spent time with his family in the house. He started feeling sick on 10/6 upon return to the [place]. He has been to [place] many times over his life and has never had malaria before. He did not take prophylaxis prior to travel. He did not note any mosquito bites while there but also did not use bed net or bug sprays, but noted he slept with the A/C on.    Febrile to 101.2 on admission, tachycardia  Labs without leukocytosis or anemia, however 33% bands with severe thrombocytopenia present. NADEGE, hyperbilirubinemia, decreased haptoglobin, and elevated LDH  Blood positive for P. falciparum antigen, parasitemia 8%, now <1%  HIV negative  CT a/p with splenomegaly without other acute pathology  CXR clear lungs  UA no pyuria, +blood  Blood cultures with NGTD     # Severe P. falciparum malaria  # Sepsis  # NADEGE, hyperbilirubinemia, lactic acidosis   # Thrombocytopenia  # Headaches     RECOMMENDATIONS:   - S/P artesunate 220 mg q12h for 3 doses  - following 3rd artesunate dose, check parasitemia level, 4 hours after the 3rd dose of Artesunate   - Complete coartem 80/480 BID for 3 days given parasitemia level<1%  - Monitor CBC, BMP, VBG q6h to monitor for anemia, NADEGE, hypoglycemia, acidosis  - follow up blood cx - negative  - urine cx in lab - NG  - Can get CT head if no improvement in headaches with tylenol   - Can follow-up with ID clinic after discharge for repeat parasitemia level  - Advised to monitor closely for symptoms recurrence given concern for recrudesce     Giuseppe Devi MD  Attending Physician, Division of Infectious Diseases  Department of Medicine   [facility]    Contact on TEAMS messaging from 9am - 5pm  Office: [phone] (after 5 PM or weekend)    
24 year old man no PMH presenting with 5 days of fevers, body aches, chills. Associated vomiting and dark-colored urine He recently returned from [place] 2 weeks ago, no malaria prophylaxis taken. ID consulted for malaria.    Patient was recently in [place] for 2 weeks and returned 2 weeks ago. While there he attended soccer games, ate out at restaurants, and spent time with his family in the house. He started feeling sick on 10/6 upon return to the [place]. He has been to [place] many times over his life and has never had malaria before. He did not take prophylaxis prior to travel. He did not note any mosquito bites while there but also did not use bed net or bug sprays, but noted he slept with the A/C on.    Febrile to 101.2 on admission, tachycardia  Labs without leukocytosis or anemia, however 33% bands with severe thrombocytopenia present. NADEGE, hyperbilirubinemia, decreased haptoglobin, and elevated LDH  Blood positive for P. falciparum antigen, parasitemia 8%  HIV negative  CT a/p with splenomegaly without other acute pathology  CXR clear lungs  UA no pyuria, +blood  Blood cultures with NGTD     # Severe P. falciparum malaria  # Sepsis  # NADEGE, hyperbilirubinemia, lactic acidosis   # Thrombocytopenia  # Headaches     RECOMMENDATIONS:   - IV artesunate 220 mg q12h for 3 doses  - following 3rd artesunate dose, check parasitemia level, 4 hours after the 3rd dose of Artesunate   - if parasitemia level >1%, use artesunate 220 mg q24h and repeat parasitemia level daily until <1%  - when parasitemia level <1%, switch to coartem 80/480 BID for 3 days  - Monitor CBC, BMP, VBG q6h to monitor for anemia, NADEGE, hypoglycemia, acidosis  - follow up blood cx - NGTD- follow until completion   - urine cx in lab - NG  - Can get CT head if no improvement in headaches with tylenol     Discussed with the primary team    Stephanie Ferrer MD  Attending Physician, Division of Infectious Diseases  Department of Medicine   [facility]    Contact on TEAMS messaging from 9am - 5pm  Office: [phone] (after 5 PM or weekend)    
Patient 24M with no PMHx presenting to the emergency department with 5 days of recurrent fevers following travel to [place] two weeks ago with TBili 7.3 on admission and BCx positive for Plasmodium falciparum (parasitemia 8%) consistent with severe falciparum malaria. 

## 2024-10-14 NOTE — PROGRESS NOTE ADULT - SUBJECTIVE AND OBJECTIVE BOX
Patient is a 24y old  Male who presents with a chief complaint of Fevers (13 Oct 2024 05:26)      SUBJECTIVE / OVERNIGHT EVENTS:  Patient seen and evaluated at bedside.    Denies any fevers, chills, CP, or SOB.      REVIEW OF SYSTEMS:    CONSTITUTIONAL:  No weakness, fevers or chills  EYES/ENT:  No visual changes;  No vertigo or throat pain   NECK:  No pain or stiffness  RESPIRATORY:  No cough, wheezing, hemoptysis; No shortness of breath  CARDIOVASCULAR:  No chest pain or palpitations  GASTROINTESTINAL:  No abdominal or epigastric pain. No nausea, vomiting, or hematemesis; No diarrhea or constipation. No melena or hematochezia.  GENITOURINARY:  No dysuria, frequency or hematuria  NEUROLOGICAL:  No numbness or weakness  SKIN:  No itching, rashes        acetaminophen     Tablet .. 650 milliGRAM(s) Oral every 6 hours PRN Temp greater or equal to 38C (100.4F), Mild Pain (1 - 3)  artemether 20 mG/lumefantrine 120 mG Tablet 4 Tablet(s) Oral two times a day  influenza   Vaccine 0.5 milliLiter(s) IntraMuscular once  lactated ringers. 1000 milliLiter(s) IV Continuous <Continuous>  melatonin 3 milliGRAM(s) Oral at bedtime PRN Insomnia        Vital Signs Last 24 Hrs  T(C): 36.6 (14 Oct 2024 05:22), Max: 37.8 (13 Oct 2024 13:16)  T(F): 97.8 (14 Oct 2024 05:22), Max: 100 (13 Oct 2024 13:16)  HR: 86 (14 Oct 2024 05:22) (86 - 102)  BP: 104/58 (14 Oct 2024 05:22) (90/67 - 120/69)  BP(mean): --  RR: 18 (14 Oct 2024 05:22) (18 - 18)  SpO2: 99% (14 Oct 2024 05:22) (97% - 100%)    Parameters below as of 14 Oct 2024 05:22  Patient On (Oxygen Delivery Method): room air          PHYSICAL EXAM:  GENERAL: NAD, lying in bed comfortably, conversational  HEAD:  Atraumatic, normocephalic  EYES: EOMI, PERRLA, conjunctiva and sclera clear  NECK: Supple, trachea midline, no JVD  HEART: +S1/S2, RRR, no murmurs, rubs, or gallops  LUNGS: Unlabored respirations. CTA b/l, no crackles, wheezing, or rhonchi  ABDOMEN: Soft, NTND, +BS. No masses or hernia palpated  EXTREMITIES: 2+ peripheral pulses bilaterally. No clubbing, cyanosis, or edema  MSK: no gross deformity in extremities, no step off or deformity in C/T/L spine, normal muscle strength/tone  NERVOUS SYSTEM: CN II-XII intact, A&Ox3, moving all extremities spontaneously, no focal deficits, sensation intact and equal in b/l extremities  SKIN: No rashes or lesions      LABS:                        14.2   3.30  )-----------( 82       ( 14 Oct 2024 04:00 )             41.8     Hgb Trend: 14.2<--, 14.4<--, 15.4<--, 14.4<--, 14.6<--  10-14    137  |  104  |  11  ----------------------------<  105[H]  3.8   |  23  |  1.10    Ca    8.3[L]      14 Oct 2024 04:00  Phos  3.8     10-14  Mg     2.10     10-14    TPro  6.5  /  Alb  3.2[L]  /  TBili  1.1  /  DBili  x   /  AST  57[H]  /  ALT  45[H]  /  AlkPhos  63  10-14    Creatinine Trend: 1.10<--, 1.02<--, 1.19<--, 1.04<--, 1.30<--, 1.18<--      LIVER FUNCTIONS - ( 14 Oct 2024 04:00 )  Alb: 3.2 g/dL / Pro: 6.5 g/dL / ALK PHOS: 63 U/L / ALT: 45 U/L / AST: 57 U/L / GGT: x             Urinalysis Basic - ( 14 Oct 2024 04:00 )    Color: x / Appearance: x / SG: x / pH: x  Gluc: 105 mg/dL / Ketone: x  / Bili: x / Urobili: x   Blood: x / Protein: x / Nitrite: x   Leuk Esterase: x / RBC: x / WBC x   Sq Epi: x / Non Sq Epi: x / Bacteria: x     Patient is a 24y old  Male who presents with a chief complaint of Fevers (13 Oct 2024 05:26)      SUBJECTIVE / OVERNIGHT EVENTS:  Patient seen and evaluated at bedside. Denies HA, F/C/N/V overnight.      REVIEW OF SYSTEMS:    CONSTITUTIONAL:  No weakness, fevers or chills  EYES/ENT:  No visual changes;  No vertigo or throat pain   NECK:  No pain or stiffness  RESPIRATORY:  No cough, wheezing, hemoptysis; No shortness of breath  CARDIOVASCULAR:  No chest pain or palpitations  GASTROINTESTINAL:  No abdominal or epigastric pain. No nausea, vomiting, or hematemesis; No diarrhea or constipation. No melena or hematochezia.  GENITOURINARY:  No dysuria, frequency or hematuria  NEUROLOGICAL:  No numbness or weakness  SKIN:  No itching, rashes        acetaminophen     Tablet .. 650 milliGRAM(s) Oral every 6 hours PRN Temp greater or equal to 38C (100.4F), Mild Pain (1 - 3)  artemether 20 mG/lumefantrine 120 mG Tablet 4 Tablet(s) Oral two times a day  influenza   Vaccine 0.5 milliLiter(s) IntraMuscular once  lactated ringers. 1000 milliLiter(s) IV Continuous <Continuous>  melatonin 3 milliGRAM(s) Oral at bedtime PRN Insomnia        Vital Signs Last 24 Hrs  T(C): 36.6 (14 Oct 2024 05:22), Max: 37.8 (13 Oct 2024 13:16)  T(F): 97.8 (14 Oct 2024 05:22), Max: 100 (13 Oct 2024 13:16)  HR: 86 (14 Oct 2024 05:22) (86 - 102)  BP: 104/58 (14 Oct 2024 05:22) (90/67 - 120/69)  BP(mean): --  RR: 18 (14 Oct 2024 05:22) (18 - 18)  SpO2: 99% (14 Oct 2024 05:22) (97% - 100%)    Parameters below as of 14 Oct 2024 05:22  Patient On (Oxygen Delivery Method): room air          PHYSICAL EXAM:  GENERAL: NAD, lying in bed comfortably, conversational  HEAD:  Atraumatic, normocephalic  EYES: EOMI, PERRLA, conjunctiva and sclera clear  NECK: Supple, trachea midline, no JVD  HEART: +S1/S2, RRR, no murmurs, rubs, or gallops  LUNGS: Unlabored respirations. CTA b/l, no crackles, wheezing, or rhonchi  ABDOMEN: Soft, NTND, +BS. No masses or hernia palpated  EXTREMITIES: 2+ peripheral pulses bilaterally. No clubbing, cyanosis, or edema  MSK: no gross deformity in extremities, normal muscle strength/tone  NERVOUS SYSTEM: CN II-XII grossly intact, A&Ox3, moving all extremities spontaneously  SKIN: No rashes or lesions      LABS:                        14.2   3.30  )-----------( 82       ( 14 Oct 2024 04:00 )             41.8     Hgb Trend: 14.2<--, 14.4<--, 15.4<--, 14.4<--, 14.6<--  10-14    137  |  104  |  11  ----------------------------<  105[H]  3.8   |  23  |  1.10    Ca    8.3[L]      14 Oct 2024 04:00  Phos  3.8     10-14  Mg     2.10     10-14    TPro  6.5  /  Alb  3.2[L]  /  TBili  1.1  /  DBili  x   /  AST  57[H]  /  ALT  45[H]  /  AlkPhos  63  10-14    Creatinine Trend: 1.10<--, 1.02<--, 1.19<--, 1.04<--, 1.30<--, 1.18<--      LIVER FUNCTIONS - ( 14 Oct 2024 04:00 )  Alb: 3.2 g/dL / Pro: 6.5 g/dL / ALK PHOS: 63 U/L / ALT: 45 U/L / AST: 57 U/L / GGT: x             Urinalysis Basic - ( 14 Oct 2024 04:00 )    Color: x / Appearance: x / SG: x / pH: x  Gluc: 105 mg/dL / Ketone: x  / Bili: x / Urobili: x   Blood: x / Protein: x / Nitrite: x   Leuk Esterase: x / RBC: x / WBC x   Sq Epi: x / Non Sq Epi: x / Bacteria: x      10/13 BCx: Parasitemia <1%

## 2024-10-14 NOTE — PROGRESS NOTE ADULT - PROBLEM SELECTOR PLAN 2
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 Plt on admission 21 -> 19. PT/PTT/INR 17.7/36.4/1.49 on admission.    -consent for transfusion  -trend plt, coags

## 2024-10-14 NOTE — DISCHARGE NOTE PROVIDER - NSFOLLOWUPCLINICS_GEN_ALL_ED_FT
[facility] - Infectious Disease  Infectious Disease  [address], [place]ious [place] [address]  Phone: [phone]  Fax:   Follow Up Time: 1 week

## 2024-10-14 NOTE — DISCHARGE NOTE NURSING/CASE MANAGEMENT/SOCIAL WORK - PATIENT PORTAL LINK FT
You can access the FollowMyHealth Patient Portal offered by [facility] by registering at the following website: [email or web address]. By joining [facility]’s FollowMyHealth portal, you will also be able to view your health information using other applications (apps) compatible with our system.

## 2024-10-14 NOTE — PROGRESS NOTE ADULT - PROBLEM SELECTOR PLAN 3
likely consumptive with hemolysis 2/2 falciparum infection    - consent for transfusion  - trend WBC, Hgb, Plt, coags  - trend Tbili, Dbili  - trend haptoglobin, LDH

## 2024-10-16 LAB
CULTURE RESULTS: SIGNIFICANT CHANGE UP
CULTURE RESULTS: SIGNIFICANT CHANGE UP
SPECIMEN SOURCE: SIGNIFICANT CHANGE UP
SPECIMEN SOURCE: SIGNIFICANT CHANGE UP